# Patient Record
Sex: FEMALE | Race: WHITE
[De-identification: names, ages, dates, MRNs, and addresses within clinical notes are randomized per-mention and may not be internally consistent; named-entity substitution may affect disease eponyms.]

---

## 2017-03-04 ENCOUNTER — HOSPITAL ENCOUNTER (OUTPATIENT)
Dept: HOSPITAL 47 - RADMAMWWP | Age: 42
End: 2017-03-04
Payer: COMMERCIAL

## 2017-03-04 DIAGNOSIS — Z12.31: Primary | ICD-10-CM

## 2017-03-06 NOTE — MM
Reason for exam: screening  (asymptomatic).

Last mammogram was performed 4 years and 1 month ago.



History:

Family history of breast cancer in maternal aunt.

Benign US left guided mammotome of the left breast, March 28, 2008.  Benign US 

left CoreBiopsy of both breasts, March 28, 2008.



Physical Findings:

A clinical breast exam by your physician is recommended on an annual basis and 

results should be correlated with mammographic findings.



MG Screening Mammo w CAD

Bilateral CC and MLO view(s) were taken.

Prior study comparison: February 14, 2013, bilateral digital screening mammo 

w/CAD.  August 2, 2012, left diagnostic mammogram w/CAD.

The breast tissue is heterogeneously dense. This may lower the sensitivity of 

mammography.  Previous mammotome biopsy in the left breast x 2. There is no 

discrete abnormality.





ASSESSMENT: Benign, BI-RAD 2



RECOMMENDATION:

Routine screening mammogram of both breasts in 1 year.

## 2017-03-28 ENCOUNTER — HOSPITAL ENCOUNTER (EMERGENCY)
Dept: HOSPITAL 47 - EC | Age: 42
Discharge: HOME | End: 2017-03-28
Payer: COMMERCIAL

## 2017-03-28 VITALS
SYSTOLIC BLOOD PRESSURE: 108 MMHG | DIASTOLIC BLOOD PRESSURE: 65 MMHG | RESPIRATION RATE: 18 BRPM | HEART RATE: 66 BPM | TEMPERATURE: 97.9 F

## 2017-03-28 DIAGNOSIS — Z79.899: ICD-10-CM

## 2017-03-28 DIAGNOSIS — K21.9: Primary | ICD-10-CM

## 2017-03-28 LAB
ALP SERPL-CCNC: 83 U/L (ref 38–126)
ALT SERPL-CCNC: 36 U/L (ref 9–52)
AMYLASE SERPL-CCNC: 78 U/L (ref 30–110)
ANION GAP SERPL CALC-SCNC: 13 MMOL/L
AST SERPL-CCNC: 28 U/L (ref 14–36)
BASOPHILS # BLD AUTO: 0.1 K/UL (ref 0–0.2)
BASOPHILS NFR BLD AUTO: 1 %
BUN SERPL-SCNC: 8 MG/DL (ref 7–17)
CALCIUM SPEC-MCNC: 9.5 MG/DL (ref 8.4–10.2)
CH: 30.8
CHCM: 34.6
CHLORIDE SERPL-SCNC: 104 MMOL/L (ref 98–107)
CO2 SERPL-SCNC: 24 MMOL/L (ref 22–30)
EOSINOPHIL # BLD AUTO: 1 K/UL (ref 0–0.7)
EOSINOPHIL NFR BLD AUTO: 11 %
ERYTHROCYTE [DISTWIDTH] IN BLOOD BY AUTOMATED COUNT: 5.14 M/UL (ref 3.8–5.4)
ERYTHROCYTE [DISTWIDTH] IN BLOOD: 13.2 % (ref 11.5–15.5)
GLUCOSE SERPL-MCNC: 85 MG/DL (ref 74–99)
HCT VFR BLD AUTO: 46.1 % (ref 34–46)
HDW: 2.48
HGB BLD-MCNC: 15.6 GM/DL (ref 11.4–16)
LUC NFR BLD AUTO: 2 %
LYMPHOCYTES # SPEC AUTO: 2.2 K/UL (ref 1–4.8)
LYMPHOCYTES NFR SPEC AUTO: 24 %
MCH RBC QN AUTO: 30.3 PG (ref 25–35)
MCHC RBC AUTO-ENTMCNC: 33.8 G/DL (ref 31–37)
MCV RBC AUTO: 89.6 FL (ref 80–100)
MONOCYTES # BLD AUTO: 0.4 K/UL (ref 0–1)
MONOCYTES NFR BLD AUTO: 4 %
NEUTROPHILS # BLD AUTO: 5.5 K/UL (ref 1.3–7.7)
NEUTROPHILS NFR BLD AUTO: 59 %
NON-AFRICAN AMERICAN GFR(MDRD): >60
PH UR: 7 [PH] (ref 5–8)
POTASSIUM SERPL-SCNC: 3.9 MMOL/L (ref 3.5–5.1)
PROT SERPL-MCNC: 7.9 G/DL (ref 6.3–8.2)
SODIUM SERPL-SCNC: 141 MMOL/L (ref 137–145)
SP GR UR: 1.01 (ref 1–1.03)
UA BILLING (MACRO VS. MICRO): (no result)
UROBILINOGEN UR QL STRIP: <2 MG/DL (ref ?–2)
WBC # BLD AUTO: 0.16 10*3/UL
WBC # BLD AUTO: 9.3 K/UL (ref 3.8–10.6)
WBC (PEROX): 9.33

## 2017-03-28 PROCEDURE — 36415 COLL VENOUS BLD VENIPUNCTURE: CPT

## 2017-03-28 PROCEDURE — 83690 ASSAY OF LIPASE: CPT

## 2017-03-28 PROCEDURE — 74000: CPT

## 2017-03-28 PROCEDURE — 80053 COMPREHEN METABOLIC PANEL: CPT

## 2017-03-28 PROCEDURE — 81025 URINE PREGNANCY TEST: CPT

## 2017-03-28 PROCEDURE — 74177 CT ABD & PELVIS W/CONTRAST: CPT

## 2017-03-28 PROCEDURE — 85025 COMPLETE CBC W/AUTO DIFF WBC: CPT

## 2017-03-28 PROCEDURE — 81003 URINALYSIS AUTO W/O SCOPE: CPT

## 2017-03-28 PROCEDURE — 99284 EMERGENCY DEPT VISIT MOD MDM: CPT

## 2017-03-28 PROCEDURE — 82150 ASSAY OF AMYLASE: CPT

## 2017-03-28 NOTE — ED
Abdominal Pain HPI





- General


Chief Complaint: Abdominal Pain


Stated Complaint: abd pain


Time Seen by Provider: 17 16:51


Source: patient, RN notes reviewed


Mode of arrival: ambulatory


Limitations: no limitations





- History of Present Illness


Initial Comments: 





Patient is a 41-year-old female presents to the emergency room for evaluation 

of abdominal pain.  Patient states she has been having right upper quadrant/

midepigastric pain for the past week.  Patient states she has pain every time 

she eats.  Patient does state she has a history of gastritis.  Patient states 

she had a cholecystectomy  with no complications.  Patient states that she 

follow-up with her primary care provider yesterday and she sent home with 

Protonix.  Patient states she took Protonix last night with no relief of 

symptoms.  Patient also states that she was prescribed Bentyl but has not taken 

that medication yet.  Patient denies any significant pain at the moment.  

Patient states she's having 3 out of 10 pain.  Patient denies any current 

nausea or vomiting.  Patient does also states she's been having diarrhea.  

Patient denies new foods or recent travel outside the country.  Patient denies 

blood in stools.  Patient does state that she has been on a diet plan that is 

high in protein low in carbs.  SPatient also states that she's been taking a 

few diet pills.  Patient is not sure if this is what causing her symptoms.  

Patient states she discontinued the diet pills.  Patient denies chest pain or 

shortness of breath.  Patient denies any significant past medical history.  

Patient denies being on any medications besides besides what was prescribed to 

her yesterday.





- Related Data


 Home Medications











 Medication  Instructions  Recorded  Confirmed


 


Pantoprazole [Protonix] 40 mg PO DAILY 17











 Allergies











Allergy/AdvReac Type Severity Reaction Status Date / Time


 


No Known Allergies Allergy   Verified 17 17:04














Review of Systems


ROS Statement: 


Those systems with pertinent positive or pertinent negative responses have been 

documented in the HPI.





ROS Other: All systems not noted in ROS Statement are negative.





Past Medical History


Additional Past Medical History / Comment(s): CONSTIPATION, DIARRHEA, HX OF 

POLYP


History of Any Multi-Drug Resistant Organisms: None Reported


Past Surgical History:  Section, Cholecystectomy


Additional Past Surgical History / Comment(s): COLONOSCOPY, EGD


Past Anesthesia/Blood Transfusion Reactions: No Reported Reaction


Past Psychological History: Depression


Smoking Status: Never smoker


Past Alcohol Use History: Rare


Past Drug Use History: None Reported





- Past Family History


  ** Father


Family Medical History: Cancer





General Exam





- General Exam Comments


Initial Comments: 





Sitting on exam bed, no acute distress.


Limitations: no limitations


General appearance: alert, in no apparent distress


Head exam: Present: atraumatic, normocephalic, normal inspection


Eye exam: Present: normal appearance


ENT exam: Present: normal exam


Neck exam: Present: normal inspection


Respiratory exam: Present: normal lung sounds bilaterally.  Absent: respiratory 

distress


Cardiovascular Exam: Present: regular rate, normal rhythm, normal heart sounds


GI/Abdominal exam: Present: soft, tenderness (Midepigastric), normal bowel 

sounds.  Absent: distended, guarding, rebound, rigid


Extremities exam: Present: normal inspection


Back exam: Present: normal inspection


Neurological exam: Present: alert, oriented X3, CN II-XII intact, normal gait


Psychiatric exam: Present: normal affect, normal mood


Skin exam: Present: warm, dry, intact, normal color.  Absent: rash





Course


 Vital Signs











  17





  16:13


 


Temperature 97.7 F


 


Pulse Rate 67


 


Respiratory 16





Rate 


 


Blood Pressure 117/74


 


O2 Sat by Pulse 98





Oximetry 














Medical Decision Making





- Medical Decision Making





Patient is a 41-year-old female presents to the emergency room for epigastric 

pain after eating.  Labs show no acute findings.  CT abdomen/pelvis show no 

acute findings.  Patient states the GI cocktail did not worsen or improve her 

symptoms because she's not having any significant pain at the moment.  Advised 

patient to follow-up with GI specialist for further evaluation and possible 

endoscopy to rule out gastric ulcer.  Advised patient to continue taking her 

medications as prescribed to her yesterday from her primary care provider.  

Patient states she understands everything that was discussed with her.  Return 

parameters discussed.  Case discussed with Dr. Freeman.





- Lab Data


Result diagrams: 


 17 17:20





 17 17:20


 Lab Results











  17 Range/Units





  17:07 17:07 17:20 


 


WBC     (3.8-10.6)  k/uL


 


RBC     (3.80-5.40)  m/uL


 


Hgb     (11.4-16.0)  gm/dL


 


Hct     (34.0-46.0)  %


 


MCV     (80.0-100.0)  fL


 


MCH     (25.0-35.0)  pg


 


MCHC     (31.0-37.0)  g/dL


 


RDW     (11.5-15.5)  %


 


Plt Count     (150-450)  k/uL


 


Neutrophils %     %


 


Lymphocytes %     %


 


Monocytes %     %


 


Eosinophils %     %


 


Basophils %     %


 


Neutrophils #     (1.3-7.7)  k/uL


 


Lymphocytes #     (1.0-4.8)  k/uL


 


Monocytes #     (0-1.0)  k/uL


 


Eosinophils #     (0-0.7)  k/uL


 


Basophils #     (0-0.2)  k/uL


 


Sodium    141  (137-145)  mmol/L


 


Potassium    3.9  (3.5-5.1)  mmol/L


 


Chloride    104  ()  mmol/L


 


Carbon Dioxide    24  (22-30)  mmol/L


 


Anion Gap    13  mmol/L


 


BUN    8  (7-17)  mg/dL


 


Creatinine    0.61  (0.52-1.04)  mg/dL


 


Est GFR (MDRD) Af Amer    >60  (>60 ml/min/1.73 sqM)  


 


Est GFR (MDRD) Non-Af    >60  (>60 ml/min/1.73 sqM)  


 


Glucose    85  (74-99)  mg/dL


 


Calcium    9.5  (8.4-10.2)  mg/dL


 


Total Bilirubin    1.3  (0.2-1.3)  mg/dL


 


AST    28  (14-36)  U/L


 


ALT    36  (9-52)  U/L


 


Alkaline Phosphatase    83  ()  U/L


 


Total Protein    7.9  (6.3-8.2)  g/dL


 


Albumin    4.5  (3.5-5.0)  g/dL


 


Amylase    78  ()  U/L


 


Lipase    153  ()  U/L


 


Urine Color   Yellow   


 


Urine Appearance   Clear   (Clear)  


 


Urine pH   7.0   (5.0-8.0)  


 


Ur Specific Gravity   1.010   (1.001-1.035)  


 


Urine Protein   Negative   (Negative)  


 


Urine Glucose (UA)   Negative   (Negative)  


 


Urine Ketones   Negative   (Negative)  


 


Urine Blood   Negative   (Negative)  


 


Urine Nitrite   Negative   (Negative)  


 


Urine Bilirubin   Negative   (Negative)  


 


Urine Urobilinogen   <2.0   (<2.0)  mg/dL


 


Ur Leukocyte Esterase   Negative   (Negative)  


 


Urine HCG, Qual  Not Detected    (Not Detectd)  














  17 Range/Units





  17:20 


 


WBC  9.3  (3.8-10.6)  k/uL


 


RBC  5.14  (3.80-5.40)  m/uL


 


Hgb  15.6  (11.4-16.0)  gm/dL


 


Hct  46.1 H  (34.0-46.0)  %


 


MCV  89.6  (80.0-100.0)  fL


 


MCH  30.3  (25.0-35.0)  pg


 


MCHC  33.8  (31.0-37.0)  g/dL


 


RDW  13.2  (11.5-15.5)  %


 


Plt Count  183  (150-450)  k/uL


 


Neutrophils %  59  %


 


Lymphocytes %  24  %


 


Monocytes %  4  %


 


Eosinophils %  11  %


 


Basophils %  1  %


 


Neutrophils #  5.5  (1.3-7.7)  k/uL


 


Lymphocytes #  2.2  (1.0-4.8)  k/uL


 


Monocytes #  0.4  (0-1.0)  k/uL


 


Eosinophils #  1.0 H  (0-0.7)  k/uL


 


Basophils #  0.1  (0-0.2)  k/uL


 


Sodium   (137-145)  mmol/L


 


Potassium   (3.5-5.1)  mmol/L


 


Chloride   ()  mmol/L


 


Carbon Dioxide   (22-30)  mmol/L


 


Anion Gap   mmol/L


 


BUN   (7-17)  mg/dL


 


Creatinine   (0.52-1.04)  mg/dL


 


Est GFR (MDRD) Af Amer   (>60 ml/min/1.73 sqM)  


 


Est GFR (MDRD) Non-Af   (>60 ml/min/1.73 sqM)  


 


Glucose   (74-99)  mg/dL


 


Calcium   (8.4-10.2)  mg/dL


 


Total Bilirubin   (0.2-1.3)  mg/dL


 


AST   (14-36)  U/L


 


ALT   (9-52)  U/L


 


Alkaline Phosphatase   ()  U/L


 


Total Protein   (6.3-8.2)  g/dL


 


Albumin   (3.5-5.0)  g/dL


 


Amylase   ()  U/L


 


Lipase   ()  U/L


 


Urine Color   


 


Urine Appearance   (Clear)  


 


Urine pH   (5.0-8.0)  


 


Ur Specific Gravity   (1.001-1.035)  


 


Urine Protein   (Negative)  


 


Urine Glucose (UA)   (Negative)  


 


Urine Ketones   (Negative)  


 


Urine Blood   (Negative)  


 


Urine Nitrite   (Negative)  


 


Urine Bilirubin   (Negative)  


 


Urine Urobilinogen   (<2.0)  mg/dL


 


Ur Leukocyte Esterase   (Negative)  


 


Urine HCG, Qual   (Not Detectd)  














- Radiology Data


Radiology results: report reviewed, image reviewed





Disposition


Clinical Impression: 


 Abdominal pain, GERD (gastroesophageal reflux disease)





Disposition: HOME SELF-CARE


Condition: Good


Instructions:  Gastroesophageal Reflux Disease (ED), Diet for Stomach Ulcers 

and Gastritis (ED)


Additional Instructions: 


Please follow up with GI specialist for further evaluation.  Continue taking 

Protonix as directed.  If any new symptom arises or symptoms worsen, return to 

ER as soon as possible.  


Referrals: 


Lisette Crawford MD [Primary Care Provider] - 1-2 days


Kasia Salgado MD [STAFF PHYSICIAN] - 1-2 days


Time of Disposition: 19:40

## 2017-03-28 NOTE — CT
EXAMINATION TYPE: CT abdomen pelvis w con

 

DATE OF EXAM: 3/28/2017 6:54 PM

 

COMPARISON: NONE

 

HISTORY: Pt states of epigastric pain after eating x1 week.

 

CT DLP: 667.6 mGycm

Automated exposure control for dose reduction was used.

 

TECHNIQUE:  Helical acquisition of images was performed from the lung bases through the pelvis.

 

CONTRAST: 

Performed without Oral Contrast and with IV Contrast, patient injected with 100 mL of Omnipaque 300.

 

FINDINGS: 

LUNG BASES: No significant abnormality is appreciated.

 

LIVER/GB: No significant abnormality is appreciated.

 

PANCREAS: No significant abnormality is seen.

 

SPLEEN: No significant abnormality is seen.

 

ADRENALS: No significant abnormality is seen.

 

KIDNEYS: No significant abnormality is seen.

 

RETROPERITONEAL ADENOPATHY:  None visualized

 

REPRODUCTIVE ORGANS: No significant abnormality is seen

 

URINARY BLADDER:  No significant abnormality is seen.

 

PELVIC ADENOPATHY:  None visualized.

 

OSSEOUS STRUCTURES:  No significant abnormality is seen.

 

BOWEL:  No significant abnormality is seen.

 

OTHER: Incidental note is made of bilateral prominent adnexal/broad ligament varices. The uterus is n
ot enlarged and has otherwise normal CT appearance.

 

IMPRESSION: 

NO ACUTE PROCESS.

## 2017-03-28 NOTE — XR
EXAMINATION TYPE: Abdominal radiographs x2 

 

DATE OF EXAM: 3/28/2017 5:42 PM

 

COMPARISON: NONE

 

HISTORY: Epigastric pain radiating around to the back with nausea for a week

 

TECHNIQUE: 2 upright radiographs.

 

FINDINGS: There is no pneumatosis and no pneumoperitoneum. The bowel gas pattern is negative. There i
s no evidence of soft tissue mass, and the skeletal structures are unremarkable. Cholecystectomy surg
ical clips are incidentally noted.

 

The visualized lung bases and pleural spaces are negative.

 

IMPRESSION: No acute process.

## 2017-04-03 ENCOUNTER — HOSPITAL ENCOUNTER (OUTPATIENT)
Dept: HOSPITAL 47 - ORWHC2ENDO | Age: 42
Discharge: HOME | End: 2017-04-03
Payer: COMMERCIAL

## 2017-04-03 VITALS — RESPIRATION RATE: 18 BRPM | SYSTOLIC BLOOD PRESSURE: 118 MMHG | DIASTOLIC BLOOD PRESSURE: 56 MMHG

## 2017-04-03 VITALS — HEART RATE: 70 BPM

## 2017-04-03 VITALS — TEMPERATURE: 98 F

## 2017-04-03 VITALS — BODY MASS INDEX: 32.8 KG/M2

## 2017-04-03 DIAGNOSIS — K44.9: ICD-10-CM

## 2017-04-03 DIAGNOSIS — K29.50: Primary | ICD-10-CM

## 2017-04-03 DIAGNOSIS — Z79.899: ICD-10-CM

## 2017-04-03 DIAGNOSIS — K21.9: ICD-10-CM

## 2017-04-03 DIAGNOSIS — R13.10: ICD-10-CM

## 2017-04-03 PROCEDURE — 43239 EGD BIOPSY SINGLE/MULTIPLE: CPT

## 2017-04-03 PROCEDURE — 81025 URINE PREGNANCY TEST: CPT

## 2017-04-03 PROCEDURE — 88342 IMHCHEM/IMCYTCHM 1ST ANTB: CPT

## 2017-04-03 PROCEDURE — 88305 TISSUE EXAM BY PATHOLOGIST: CPT

## 2017-04-03 NOTE — P.PCN
Date of Procedure: 04/03/17


Procedure(s) Performed: 


Procedure: Esophagogastroduodenoscopy and biopsy.





Preoperative diagnosis: Acute epigastric pain rule out peptic ulcer disease.





Postoperative diagnosis: Small sliding hiatal hernia with no obvious 

esophagitis or complicated reflux disease.  Mild antral gastritis.  No ulcers, 

gastric outlet obstruction or bleeding.  Multiple biopsies obtained from the 

duodenum, antrum and esophagus.





Preparation and sedation: Was provided by anesthesia.





Brief clinical history: The patient is a 41-year-old female who is referred for 

this evaluation for acute onset of epigastric pain for possible peptic ulcer 

disease.  The patient had prior cholecystectomy. She had an upper endoscopy and 

colonoscopy back in October 2016 for chronic stomach upset symptoms of around 6 

months duration and was found to have mild gastritis.  The patient received 

Zantac which she was still taking until the onset of her current symptoms 

around 1-1/2 to 2 weeks ago.  The patient has been complaining of postprandial 

epigastric pain and indigestion feeling that would last for several hours.  

Associated with nausea but no vomiting.  For the last week, she was having 

diarrhea as well.  The patient was started on Protonix as outpatient, however, 

as she has not improved, this evaluation was requested to rule out peptic ulcer 

disease or other pathology.





Procedure: With the patient on her left lateral decubitus position and after 

informed consent and adequate sedation, I passed the Olympus- video 

upper endoscope through the cricopharyngeus down the esophagus.  GE junction 

was around 35-36 cm from the incisors and there was a small sliding hiatal 

hernia.  The esophagus did not show any obvious erosions, ulcers, strictures or 

Porras's esophagus.  The endoscope was then advanced into the stomach which 

was insufflated with air and inspected in detail including the retroflex view 

in the cardia.  There was some mottling and erythema in the antrum but no 

ulcers or erosions.  Pyloric channel did not show any ulcers.  Duodenal bulb, 

post bulbar area and descending duodenum showed minimal erythema with no ulcers 

or erosions.  I obtained multiple biopsies from the duodenum, antrum and 

esophagus then the endoscope was withdrawn.





The patient tolerated the procedure well.





Plan: The patient was reassured.  Will await biopsy results.  She will follow-

up with you as planned and further plans will be made based on her course.

## 2018-01-18 ENCOUNTER — HOSPITAL ENCOUNTER (OUTPATIENT)
Dept: HOSPITAL 47 - LABWHC1 | Age: 43
Discharge: HOME | End: 2018-01-18
Attending: INTERNAL MEDICINE
Payer: COMMERCIAL

## 2018-01-18 DIAGNOSIS — K52.9: ICD-10-CM

## 2018-01-18 DIAGNOSIS — R50.9: Primary | ICD-10-CM

## 2018-01-18 PROCEDURE — 87502 INFLUENZA DNA AMP PROBE: CPT

## 2018-04-04 ENCOUNTER — HOSPITAL ENCOUNTER (OUTPATIENT)
Dept: HOSPITAL 47 - RADMAMWWP | Age: 43
Discharge: HOME | End: 2018-04-04
Payer: COMMERCIAL

## 2018-04-04 DIAGNOSIS — N63.10: Primary | ICD-10-CM

## 2018-04-04 PROCEDURE — 77066 DX MAMMO INCL CAD BI: CPT

## 2018-04-04 NOTE — MM
Reason for exam: clinical finding.

Last mammogram was performed 1 year and 1 month ago.



History:

Family history of breast cancer in maternal aunt.

Benign US left guided mammotome of the left breast, March 28, 2008.  Benign US 

left CoreBiopsy of both breasts, March 28, 2008.



Physical Findings:

Nurse Summary: 0.5cm nodule in the left breast at 3 o'clock (nurse ashely).



MG Diagnostic Mammo w CAD CAITLIN

Bilateral CC and MLO view(s) were taken.

Prior study comparison: March 4, 2017, bilateral MG screening mammo w CAD.  

February 14, 2013, bilateral digital screening mammo w/CAD.

The breast tissue is heterogeneously dense. This may lower the sensitivity of 

mammography.

Finding: There is an equal density (isodense), indistinct irregular mass in the 

upper quadrant, posterior position of the right breast, different in appearance, 

likely projectional. Previous mammotome biopsy in the left breast x 2.

New finding since March 4, 2017 and February 14, 2013.



These results were verbally communicated with the patient and result sheet given 

to the patient on 4/4/18.





ASSESSMENT: Incomplete: need additional imaging evaluation, BI-RAD 0



RECOMMENDATION:

Ultrasound of the right breast.

## 2018-04-04 NOTE — USB
Reason for exam: additional evaluation requested from abnormal screening.



History:

Family history of breast cancer in maternal aunt.

Benign US left guided mammotome of the left breast, March 28, 2008.  Benign US 

left CoreBiopsy of both breasts, March 28, 2008.



US Breast Limited RT

Right breast ultrasound demonstrates a 0.3 x 0.2 x 0.4cm lesion too small to 

characterize at 3 o'clock.



These results were verbally communicated with the patient and result sheet given 

to the patient on 4/4/18.





ASSESSMENT: Probably benign, BI-RAD 3



RECOMMENDATION:

Follow-up diagnostic mammogram of the right breast in 6 months.

## 2018-07-03 ENCOUNTER — HOSPITAL ENCOUNTER (OUTPATIENT)
Dept: HOSPITAL 47 - RADMAMWWP | Age: 43
Discharge: HOME | End: 2018-07-03
Payer: COMMERCIAL

## 2018-07-03 DIAGNOSIS — R92.8: Primary | ICD-10-CM

## 2018-07-03 PROCEDURE — 77065 DX MAMMO INCL CAD UNI: CPT

## 2018-07-03 PROCEDURE — 77061 BREAST TOMOSYNTHESIS UNI: CPT

## 2018-07-03 NOTE — USB
Reason for exam: additional evaluation requested from abnormal screening.



History:

Family history of breast cancer in maternal aunt.

Benign US left guided mammotome of the left breast, March 28, 2008.  Benign US 

left CoreBiopsy of both breasts, March 28, 2008.



US Breast Limited RT

Right limited breast ultrasound including focal area of concern, retroareolar and 

axilla demonstrates a 5 x 4 x 5mm oval, cystic lesion at 11 o'clock, a 3 x 2 x 3mm

oval, cystic lesion at 3 o'clock appears cystic today and smaller than prior 

however being this appeared solid on the prior a 6 month follow up ultrasound 

remains recommended and a 5mm oval, lymph node at the axilla tail, morphologically

normal.



These results were verbally communicated with the patient and result sheet given 

to the patient on 7/3/18.





ASSESSMENT: Probably benign, BI-RAD 3



RECOMMENDATION:

Ultrasound and follow-up diagnostic mammogram of the right breast in 6 months.

(for superior asymmetry on ML view only)

## 2018-07-03 NOTE — MM
Reason for exam: follow-up at short interval from prior study.

Last mammogram was performed 3 months ago.



History:

Family history of breast cancer in maternal aunt.

Benign US left guided mammotome of the left breast, March 28, 2008.  Benign US 

left CoreBiopsy of both breasts, March 28, 2008.



Physical Findings:

Nurse Summary: 0.5cm nodule in the right breast at 3 o'clock (nurse mj).



MG 3D Diag Mammo W/Cad RT

CC, MLO, and ML view(s) were taken of the right breast.

Prior study comparison: April 4, 2018, bilateral MG diagnostic mammo w CAD CAITLIN.  

March 4, 2017, bilateral MG screening mammo w CAD.

The breast tissue is heterogeneously dense. This may lower the sensitivity of 

mammography.  The known 3 o'clock, 4cm from nipple, 4mm mass seen sonographically 

4/4/18 is not seen mammographically and will be followed with ultrasound today. 

Upper outer quadrant focal asymmetry at posterior depth appears similar to the 

prior. However, precautionary ultrasound will be performed.



These results were verbally communicated with the patient and result sheet given 

to the patient on 7/3/18.





ASSESSMENT: Incomplete: need additional imaging evaluation, BI-RAD 0



RECOMMENDATION:

Ultrasound of the right breast.

(upper right breast and retroareolar palpable)

## 2019-01-07 ENCOUNTER — HOSPITAL ENCOUNTER (OUTPATIENT)
Dept: HOSPITAL 47 - RADMAMWWP | Age: 44
Discharge: HOME | End: 2019-01-07
Attending: SURGERY
Payer: COMMERCIAL

## 2019-01-07 DIAGNOSIS — R92.8: Primary | ICD-10-CM

## 2019-01-07 PROCEDURE — 77061 BREAST TOMOSYNTHESIS UNI: CPT

## 2019-01-07 PROCEDURE — 77065 DX MAMMO INCL CAD UNI: CPT

## 2019-01-08 NOTE — USB
Reason for exam: clinical finding.



History:

Family history of breast cancer in maternal aunt.

Benign US left guided mammotome of the left breast, March 28, 2008.  Benign US 

left CoreBiopsy of both breasts, March 28, 2008.

Indicated problem(s): palpable abnormality in the right breast.



Physical Findings:

Nurse Summary: less than a 1 cm palpable abnormality right breast.



US Breast RT

Right complete breast ultrasound includes all four quadrants, the retroareolar 

region and axilla. Finding demonstrates a 4 x 2 x 3 mm cystic lesion at 3 o'clock,

a 4 x 3 x 4 mm cluster cystic lesion at 11 o'clock and at the palpable ductal 

ectasia is seen.



These results were verbally communicated with the patient and result sheet given 

to the patient on 1/7/19.





ASSESSMENT: Benign, BI-RAD 2



RECOMMENDATION:

Routine screening mammogram of both breasts in 6 months.

## 2019-01-08 NOTE — MM
Reason for exam: follow-up at short interval from prior study.

Last mammogram was performed 6 months ago.



History:

Family history of breast cancer in maternal aunt.

Benign US left guided mammotome of the left breast, March 28, 2008.  Benign US 

left CoreBiopsy of both breasts, March 28, 2008.

Indicated problem(s): other indicated problem in the right breast.



Physical Findings:

Nurse Summary: less than a 1 cm movable lesion right breast.



MG 3D Diag Mammo W/Cad RT

CC and MLO view(s) were taken of the right breast.

Prior study comparison: July 3, 2018, right breast MG 3d diag mammo w/cad RT.  

April 4, 2018, bilateral MG diagnostic mammo w CAD CAITLIN.

The breast tissue is heterogeneously dense. This may lower the sensitivity of 

mammography.

There are benign-appearing round calcification in the right breast.  No discrete 

abnormality.



These results were verbally communicated with the patient and result sheet given 

to the patient on 1/7/19.





ASSESSMENT: Incomplete: need additional imaging evaluation, BI-RAD 0



RECOMMENDATION:

Ultrasound of the right breast.

## 2019-07-08 ENCOUNTER — HOSPITAL ENCOUNTER (OUTPATIENT)
Dept: HOSPITAL 47 - RADMAMWWP | Age: 44
Discharge: HOME | End: 2019-07-08
Attending: SURGERY
Payer: COMMERCIAL

## 2019-07-08 DIAGNOSIS — Z12.31: Primary | ICD-10-CM

## 2019-07-08 PROCEDURE — 77067 SCR MAMMO BI INCL CAD: CPT

## 2019-07-09 NOTE — MM
Reason for exam: screening  (asymptomatic).

Last mammogram was performed 6 months ago.



History:

Family history of breast cancer in maternal aunt.

Benign US left guided mammotome of the left breast, March 28, 2008.  Benign US 

left CoreBiopsy of both breasts, March 28, 2008.



Physical Findings:

A clinical breast exam by your physician is recommended on an annual basis and 

results should be correlated with mammographic findings.



MG Screening Mammo w CAD

Bilateral CC and MLO view(s) were taken.

Prior study comparison: January 7, 2019, right breast MG 3d diag mammo w/cad RT.  

July 3, 2018, right breast MG 3d diag mammo w/cad RT.

The breast tissue is heterogeneously dense. This may lower the sensitivity of 

mammography.  No suspicious abnormality. Left biopsy markers noted.  No 

significant changes when compared with prior studies.





ASSESSMENT: Negative, BI-RAD 1



RECOMMENDATION:

Routine screening mammogram of both breasts in 1 year.

## 2019-08-25 ENCOUNTER — HOSPITAL ENCOUNTER (EMERGENCY)
Dept: HOSPITAL 47 - EC | Age: 44
Discharge: HOME | End: 2019-08-25
Payer: COMMERCIAL

## 2019-08-25 VITALS
TEMPERATURE: 97.7 F | SYSTOLIC BLOOD PRESSURE: 119 MMHG | RESPIRATION RATE: 20 BRPM | HEART RATE: 97 BPM | DIASTOLIC BLOOD PRESSURE: 73 MMHG

## 2019-08-25 DIAGNOSIS — Z79.899: ICD-10-CM
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
## 2019-08-25 DIAGNOSIS — R22.32: Primary | ICD-10-CM

## 2019-08-25 PROCEDURE — 99284 EMERGENCY DEPT VISIT MOD MDM: CPT
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
## 2019-12-23 ENCOUNTER — HOSPITAL ENCOUNTER (OUTPATIENT)
Dept: HOSPITAL 47 - RADUSWWP | Age: 44
Discharge: HOME | End: 2019-12-23
Attending: FAMILY MEDICINE
Payer: COMMERCIAL

## 2019-12-23 DIAGNOSIS — R10.2: Primary | ICD-10-CM

## 2019-12-23 PROCEDURE — 76830 TRANSVAGINAL US NON-OB: CPT

## 2019-12-24 NOTE — US
EXAMINATION TYPE: US transvaginal

 

DATE OF EXAM: 2019

 

COMPARISON: CT 2017

 

CLINICAL HISTORY: R10.2 PELVIC PAIN. Intermittent  pelvic pain/pressure x 1 month; 

 

TECHNIQUE:  TV US. Transvaginal sonographic images were medically necessary per physician's order

 

Date of LMP:  2019

 

EXAM MEASUREMENTS:

 

Uterus:  9.6 x 5.7 x 4.5  cm

Endometrial Stripe: 0.8 cm upper right endometrium and 0.7cm upper left endometrium 

Right Ovary:  2.5 x 2.4 x 1.5 cm

Left Ovary:  2.6 x 2.0 x 1.8 cm

 

 

 

1. Uterus:  Anteverted; small amount of free fluid in cervix = 0.8 x 0.7 x 0.1cm  

2. Endometrium:  arcuate appearance to upper endometrium; thicker for Day 10LMP 

3. Right Ovary:  multifollicular with largest cyst = 0.8 x 0.8 x 1.0cm 

4. Left Ovary:  multiple follicles with largest = 0.5 x 0.7 x 0.6cm 

**Spectral, color and waveform Doppler imaging shows good arterial and venous flow within the ovaries
; .

5. Bilateral Adnexa:  wnl

6. Posterior cul-de-sac:  wnl

 

Heterogeneous uterus with somewhat arcuate-type morphology appreciated on transverse imaging. Tiny fr
ee fluid in the endometrial canal at level cervix. Endometrial stripe measures upper limits of normal
 for late proliferative cycle. No free fluid in pelvis.

 

 Both ovaries are seen and normal in size with scattered small follicles

 

IMPRESSION: No suspicious findings seen to account for patient's symptoms of intermittent pelvic pain
 and pressure.

## 2021-03-30 ENCOUNTER — HOSPITAL ENCOUNTER (OUTPATIENT)
Dept: HOSPITAL 47 - RADUSWWP | Age: 46
Discharge: HOME | End: 2021-03-30
Attending: FAMILY MEDICINE
Payer: COMMERCIAL

## 2021-03-30 DIAGNOSIS — R13.10: Primary | ICD-10-CM

## 2021-03-30 PROCEDURE — 76536 US EXAM OF HEAD AND NECK: CPT

## 2021-03-30 NOTE — US
EXAMINATION TYPE: US thyroid st tissue head/neck

 

DATE OF EXAM: 3/30/2021

 

COMPARISON: NONE

 

CLINICAL HISTORY: 45-year-old female R13.10 Dysphagia. 

 

TECHNIQUE: Multiple sonographic images of the thyroid gland are obtained.

 

FINDINGS:

 

GLAND SIZE:

 

Right Lobe: 5.0 x 1.7 x 1.9 cm

** Overall Parenchyma:  homogenous

Left Lobe: 4.8 x 1.2 x 1.5  cm

** Overall Parenchyma:  homogeneous

Isthmus Thickness:  .3 cm

 

NODULES

 

RIGHT:   # of nodules measured on right:   0 

 

 

LEFT:    # of nodules measured on left: 0

 

 

ISTHMUS:    # of nodules measured in the isthmus:  0

 

Bilateral neck scanned, no evidence of lymphadenopathy.

 

 

 

 

 

IMPRESSION:

Borderline thyromegaly. No discrete nodules.

## 2021-04-15 ENCOUNTER — HOSPITAL ENCOUNTER (OUTPATIENT)
Dept: HOSPITAL 47 - RADECHMAIN | Age: 46
Discharge: HOME | End: 2021-04-15
Attending: FAMILY MEDICINE
Payer: COMMERCIAL

## 2021-04-15 DIAGNOSIS — R00.2: Primary | ICD-10-CM

## 2021-04-15 PROCEDURE — 93270 REMOTE 30 DAY ECG REV/REPORT: CPT

## 2021-04-26 ENCOUNTER — HOSPITAL ENCOUNTER (OUTPATIENT)
Dept: HOSPITAL 47 - ORWHC2ENDO | Age: 46
Discharge: HOME | End: 2021-04-26
Attending: INTERNAL MEDICINE
Payer: COMMERCIAL

## 2021-04-26 VITALS — TEMPERATURE: 98.3 F

## 2021-04-26 VITALS — HEART RATE: 65 BPM | SYSTOLIC BLOOD PRESSURE: 116 MMHG | DIASTOLIC BLOOD PRESSURE: 68 MMHG

## 2021-04-26 VITALS — BODY MASS INDEX: 35.6 KG/M2

## 2021-04-26 VITALS — RESPIRATION RATE: 16 BRPM

## 2021-04-26 DIAGNOSIS — K29.50: Primary | ICD-10-CM

## 2021-04-26 DIAGNOSIS — Z80.0: ICD-10-CM

## 2021-04-26 DIAGNOSIS — K21.9: ICD-10-CM

## 2021-04-26 DIAGNOSIS — Z79.899: ICD-10-CM

## 2021-04-26 PROCEDURE — 88305 TISSUE EXAM BY PATHOLOGIST: CPT

## 2021-04-26 PROCEDURE — 43239 EGD BIOPSY SINGLE/MULTIPLE: CPT

## 2021-04-26 PROCEDURE — 81025 URINE PREGNANCY TEST: CPT

## 2021-04-26 NOTE — P.PCN
Date of Procedure: 04/26/21


Description of Procedure: 





BRIEF HISTORY: 


Patient is a 45-year-old female presenting for outpatient 

esophagogastroduodenoscopy for evaluation of GERD without esophagitis.  Patient 

has reported symptoms of epigastric abdominal pain.  She started on a course of 

omeprazole therapy with no improvement.  She also reports some globus sensation 

and difficulty swallowing at times.  Previously the patient had EGD in 04/2017 

with findings of mild gastritis and small hiatal hernia.  She does report a 

family history of esophageal cancer in her mother.. 





PROCEDURE PERFORMED: 


Esophagogastroduodenoscopy with biopsy.





PREOPERATIVE DIAGNOSIS: 


GERD without esophagitis, globus. 





ESTIMATED BLOOD LOSS: 


Minimal.





IV sedation per anesthesia. 





PROCEDURE: 


After informed consent was obtained, the patient  was brought into the endoscopy

unit. IV sedation was administered by Anesthesia under continuous monitoring. 

Initially the Olympus GIF-190 video endoscope was inserted into the mouth. 

Esophagus intubated without any difficulty. It was gradually advanced into the 

stomach and duodenum and carefully examined. The bulb and the second part of the

duodenum appeared normal, with biopsies of the duodenum taken. The scope at this

time was withdrawn to the stomach, adequately insufflated with air, and upon 

careful examination, mucosa of the antrum, body, cardia and the fundus appeared 

normal,  Except for some mild punctate erythema in the antrum and body 

suggestive of mild gastritis biopsies taken. The scope was then withdrawn into 

the esophagus. The GE junction was located at 37 cm from the incisors,  With a 

small 2 cm hiatal hernia noted.  Biopsies of lower esophagus were taken. The e

sophagus appeared normal. There were no erosions or ulcerations seen and the 

patient tolerated the procedure well. 





IMPRESSION: 


1.  Mild gastritis .


2.  Small hiatal hernia.


3.  Biopsies of the duodenum, antrum body and lower esophagus.





RECOMMENDATIONS: 


The findings of this examination were discussed with the patient and her family.

 Okay to resume diet.  Okay to resume medications.  Await pathology from 

biopsies.  Follow up in the GI clinic as needed.  Can consider a retrial of PPI 

therapy to see if there is any symptomatic improvement.

## 2021-05-26 ENCOUNTER — HOSPITAL ENCOUNTER (OUTPATIENT)
Dept: HOSPITAL 47 - RADMAMWWP | Age: 46
Discharge: HOME | End: 2021-05-26
Attending: OBSTETRICS & GYNECOLOGY
Payer: COMMERCIAL

## 2021-05-26 DIAGNOSIS — Z12.31: Primary | ICD-10-CM

## 2021-05-26 DIAGNOSIS — Z80.3: ICD-10-CM

## 2021-05-26 PROCEDURE — 77063 BREAST TOMOSYNTHESIS BI: CPT

## 2021-05-26 PROCEDURE — 77067 SCR MAMMO BI INCL CAD: CPT

## 2021-05-27 NOTE — MM
Reason for exam: screening  (asymptomatic).

Last mammogram was performed 1 year and 11 months ago.



History:

Family history of breast cancer in maternal aunt.

Benign US left guided mammotome of the left breast, March 28, 2008.  Benign US 

left CoreBiopsy of both breasts, March 28, 2008.



Physical Findings:

A clinical breast exam by your physician is recommended on an annual basis and 

results should be correlated with mammographic findings.



MG 3D Screening Mammo W/Cad

Bilateral CC and MLO view(s) were taken.

Prior study comparison: July 8, 2019, bilateral MG screening mammo w CAD.  January 7, 2019, right breast MG 3d diag mammo w/cad RT.

The breast tissue is heterogeneously dense. This may lower the sensitivity of 

mammography.

Finding: There is a new 4 mm obscured round mass in the anterior position of the 

right breast on CC 40/74 and MLO view Previous mammotome biopsy in the left breast

x 2. There is a chronic nodularity in the left breast, 9mm, increased in size, no

prior left breast ultrasound, upper outer quadrant middle depth.





ASSESSMENT: Incomplete: need additional imaging evaluation, BI-RAD 0



RECOMMENDATION:

Ultrasound of both breasts.



[facility] will attempt to contact patient  to return for ultrasound.

## 2021-06-03 ENCOUNTER — HOSPITAL ENCOUNTER (OUTPATIENT)
Dept: HOSPITAL 47 - RADUSWWP | Age: 46
Discharge: HOME | End: 2021-06-03
Attending: OTOLARYNGOLOGY
Payer: COMMERCIAL

## 2021-06-03 DIAGNOSIS — K59.00: Primary | ICD-10-CM

## 2021-06-03 DIAGNOSIS — K44.9: ICD-10-CM

## 2021-06-03 PROCEDURE — 74220 X-RAY XM ESOPHAGUS 1CNTRST: CPT

## 2021-06-03 NOTE — FL
EXAMINATION TYPE: FL barium swallow

 

DATE OF EXAM: 6/3/2021

 

CLINICAL HISTORY: 45-year-old with chest discomfort. Constipation and diarrhea. Fullness in throat.

 

Fluoroscopy time is 2 minutes and 1 second

 

TECHNIQUE:  A double contrast esophagram is performed utilizing air and barium.   

 

COMPARISON: None

 

FINDINGS: 

 

The esophagus shows normal motility and emptying into the stomach. There is a small sliding-type hiat
al hernia.. No significant gastroesophageal reflux was seen during real time performance of this stud
y. Stomach and proximal small bowel are grossly unremarkable. Surgical clips in the right upper quadr
ant.

 

IMPRESSION:

1. Small sliding-type hiatal hernia. No evidence of gastroesophageal reflux.

## 2021-06-09 ENCOUNTER — HOSPITAL ENCOUNTER (OUTPATIENT)
Dept: HOSPITAL 47 - LABWHC1 | Age: 46
Discharge: HOME | End: 2021-06-09
Attending: OTOLARYNGOLOGY
Payer: COMMERCIAL

## 2021-06-09 DIAGNOSIS — E01.0: Primary | ICD-10-CM

## 2021-06-09 DIAGNOSIS — R53.83: ICD-10-CM

## 2021-06-09 DIAGNOSIS — M79.10: ICD-10-CM

## 2021-06-09 PROCEDURE — 86376 MICROSOMAL ANTIBODY EACH: CPT

## 2021-06-09 PROCEDURE — 36415 COLL VENOUS BLD VENIPUNCTURE: CPT

## 2021-06-09 PROCEDURE — 86431 RHEUMATOID FACTOR QUANT: CPT

## 2021-06-09 PROCEDURE — 84443 ASSAY THYROID STIM HORMONE: CPT

## 2021-06-09 PROCEDURE — 84439 ASSAY OF FREE THYROXINE: CPT

## 2021-06-09 PROCEDURE — 86038 ANTINUCLEAR ANTIBODIES: CPT

## 2021-06-10 LAB
RHEUMATOID FACT SERPL-ACNC: 5 IU/ML (ref 0–15)
T4 FREE SERPL-MCNC: 1 NG/DL (ref 0.8–1.8)

## 2021-06-28 ENCOUNTER — HOSPITAL ENCOUNTER (OUTPATIENT)
Dept: HOSPITAL 47 - RADUSWWP | Age: 46
Discharge: HOME | End: 2021-06-28
Attending: OBSTETRICS & GYNECOLOGY
Payer: COMMERCIAL

## 2021-06-28 DIAGNOSIS — Z80.3: ICD-10-CM

## 2021-06-28 DIAGNOSIS — N60.01: Primary | ICD-10-CM

## 2021-06-29 NOTE — USB
Reason for exam: additional evaluation requested from abnormal screening.



History:

Family history of breast cancer in maternal aunt.

Benign US left guided mammotome of the left breast, March 28, 2008.  Benign US 

left CoreBiopsy of both breasts, March 28, 2008.



Physical Findings:

Nurse did not find any significant physical abnormalities on exam.



US Breast Workup Limited CAITLIN

Right limited breast ultrasound including focal area of concern, retroareolar and 

axilla demonstrates a 0.4 x 0.3 x 0.3cm cystic, complex lesion with septations at 

6 o'clock and a 0.4 x 0.2 x 0.3cm cystic lesion at 9 o'clock, may corresond to 

mammographic nodule.



Left limited breast ultrasound including focal area of concern, retroareolar and 

axilla demonstrates a 0.8 x 0.5 x 0.7cm lymph node at 3 o'clock, corresponds to 

mammogram, benign appearing. Left breast back to screening mammogram in 1 year.



These results were verbally communicated with the patient and result sheet given 

to the patient on 6/28/21.





ASSESSMENT: Probably benign, BI-RAD 3



RECOMMENDATION:

Ultrasound of the right breast in 6 months.

## 2022-01-17 ENCOUNTER — HOSPITAL ENCOUNTER (OUTPATIENT)
Dept: HOSPITAL 47 - RADUSWWP | Age: 47
Discharge: HOME | End: 2022-01-17
Attending: OBSTETRICS & GYNECOLOGY
Payer: COMMERCIAL

## 2022-01-17 DIAGNOSIS — Z80.3: ICD-10-CM

## 2022-01-17 DIAGNOSIS — N60.01: Primary | ICD-10-CM

## 2022-01-17 NOTE — USB
Reason for exam: follow-up at short interval from prior study.



History:

Family history of breast cancer in maternal aunt.

Benign US left guided mammotome of the left breast, March 28, 2008.  Benign US 

left CoreBiopsy of both breasts, March 28, 2008.



Physical Findings:

Nurse did not find any significant physical abnormalities on exam.



US Breast Limited RT

Right limited breast ultrasound including focal area of concern, retroareolar and 

axilla demonstrates a 3 x 3 x 3mm oval, mixed, stable lesion at 6 o'clock, 

unchanged, a 3 x 2 x 3mm oval, cystic, stable lesion at 9 o'clock, unchanged and 

duct ectasia.



These results were verbally communicated with the patient and result sheet given 

to the patient on 1/17/22.





ASSESSMENT: Probably benign, BI-RAD 3



RECOMMENDATION:

Ultrasound of the right breast in 6 months.

Return to routine screening mammogram schedule for both breasts.

Back on schedule for May 2022.

## 2022-02-04 ENCOUNTER — HOSPITAL ENCOUNTER (OUTPATIENT)
Dept: HOSPITAL 47 - RADCTMAIN | Age: 47
Discharge: HOME | End: 2022-02-04
Attending: FAMILY MEDICINE
Payer: COMMERCIAL

## 2022-02-04 DIAGNOSIS — N32.89: ICD-10-CM

## 2022-02-04 DIAGNOSIS — N28.89: ICD-10-CM

## 2022-02-04 DIAGNOSIS — K44.9: Primary | ICD-10-CM

## 2022-02-04 PROCEDURE — 74177 CT ABD & PELVIS W/CONTRAST: CPT

## 2022-02-04 NOTE — CT
EXAMINATION TYPE: CT abdomen pelvis w con

 

DATE OF EXAM: 2/4/2022

 

COMPARISON: 3/28/2017

 

HISTORY: 46-year-old female R1 0.31, right lower quadrant pain, Abdomen Pain

 

TECHNIQUE: Contiguous axial scanning of the abdomen and pelvis following administration of 100 ml Iso
yvrose 300 IV contrast.  Delayed images through the kidneys and coronal/sagittal reconstructions perform
ed.

 

CT DLP: 918.7 mGycm

Automated exposure control for dose reduction was used.

 

 

FINDINGS:

Heart normal size without pericardial effusion. Lung bases clear without pleural effusion.

 

Small hiatal hernia.

 

No focal liver lesion or biliary ductal dilatation. Portal venous system is patent.

 

Cholecystectomy clips.

 

Adrenal glands, spleen, small hilar splenule, and pancreas appear within normal limits.

 

4.5 cm left upper to midpole renal cyst versus 3.5 cm, previously.

 

There is mild bilateral pelvicaliectasis and mild fullness of the bilateral ureters.

 

No dilated small bowel, free fluid, or free air. No mesenteric or retroperitoneal lymphadenopathy.

 

Normal appendix. Oral contrast progressed to the distal transverse colon. No significant stool burden
. Mild to moderate stool within the distal sigmoid and rectum and some oral contrast material seen he
re as well.

 

Marked distention of the urinary bladder up to 12.2 cm wide filling the pelvis identified and 13.2 cm
 craniocaudal.

 

Uterus is anteverted. Both ovaries are visualized. There is a crenulated, peripherally enhancing cyst
ic appearing structure measuring 1.6 cm in the left ovary. In addition, there may be a 2.3 cm additio
nal cystic structure of the left ovary. No abnormal fluid collection in the pelvis or pelvic lymphade
nopathy.

 

Bones: Mild degenerative spurring at both hips. No osseous destructive process. Mild degenerative dis
c disease lower thoracic spine.

 

 

 

 

 

IMPRESSION: 

 

1. PROMINENT DISTENTION OF THE URINARY BLADDER FILLING THE PELVIS SIDE TO SIDE AND UP TO 13.2 CM CRAN
IOCAUDAL. GIVEN MILD PELVICALIECTASIS/FULLNESS OF THE RENAL COLLECTING SYSTEMS, CORRELATE TO EXCLUDE 
BLADDER OUTLET OBSTRUCTION.

 

2. SUSPECT A 1.6 CM CORPUS LUTEUM LEFT OVARY AND PROBABLE ADDITIONAL 2.3 CM LEFT OVARIAN DOMINANT FOL
LICLE. PELVIC ULTRASOUND FOLLOW-UP IN 6-8 WEEKS TO BETTER CHARACTERIZE AND ASSESS FOR INVOLUTION.

 

3. A BENIGN LEFT RENAL CYST HAS GROWN IN THE INTERVAL NOW MEASURING 4.5 CM VERSUS 3.5 CM IN 2017.

 

4. SMALL HIATAL HERNIA.

## 2022-02-21 ENCOUNTER — HOSPITAL ENCOUNTER (OUTPATIENT)
Dept: HOSPITAL 47 - RADUSWWP | Age: 47
Discharge: HOME | End: 2022-02-21
Attending: FAMILY MEDICINE
Payer: COMMERCIAL

## 2022-02-21 DIAGNOSIS — N13.30: Primary | ICD-10-CM

## 2022-02-21 PROCEDURE — 76857 US EXAM PELVIC LIMITED: CPT

## 2022-02-21 NOTE — US
EXAMINATION TYPE: US bladder

 

DATE OF EXAM: 2/21/2022

 

COMPARISON: CT 2/4/22, 3/28/17  

 

CLINICAL HISTORY: N32.0 Bladder outlet obstruction.

 

EXAM MEASUREMENTS:

 

Post Void Residual Volume:  31.1 mL. Normal less than 50 mL.

 

There is a cyst at the superior pole left kidney. Some mild prominence of the right renal collecting 
system is noted.

 

Color Doppler performed to assess ureteral jets.

** Bilateral Jets seen:  Yes

 

** Normal Post Void Residual (less than 50ml):  Yes

 

Bladder wnl. Urinary bladder appears sonolucent. The posterior wall is normal. Ureteral jets appear u
nremarkable.

 

IMPRESSION:  

1. Normal ultrasound of urinary bladder. No significant post void residual.

2. Mild right hydronephrosis.

## 2022-03-24 ENCOUNTER — HOSPITAL ENCOUNTER (OUTPATIENT)
Dept: HOSPITAL 47 - RADUSWWP | Age: 47
Discharge: HOME | End: 2022-03-24
Attending: FAMILY MEDICINE
Payer: COMMERCIAL

## 2022-03-24 DIAGNOSIS — Q51.810: Primary | ICD-10-CM

## 2022-03-24 PROCEDURE — 76830 TRANSVAGINAL US NON-OB: CPT

## 2022-03-24 NOTE — US
EXAMINATION TYPE: US transvaginal

 

DATE OF EXAM: 3/24/2022

 

COMPARISON: US, CT

 

CLINICAL HISTORY: N83.299. Complex ovarian cyst. Hx 3 C Sections. . 

 

TECHNIQUE:  Transvaginal (TV).  

 

Date of LMP:  2022 

 

EXAM MEASUREMENTS:

 

Uterus:  9.6 x 5.9 x 5.3 cm

Endometrial Stripe: Arcuate appearance of uterus. Right upper endometrium: 0.5 cm. Left upper endomet
rium: 0.7 cm.

Right Ovary:  3.5 x 2.2 x 2.1 cm

Left Ovary:  2.8 x 1.5 x 1.4 cm

 

 

1. Uterus:  Anteverted   Appears heterogeneous.

2. Endometrium:  Fluid seen within cervix: 1.7 x 0.8 x 0.3 cm. Left endo appears slightly thicker for
 Day 13

3. Right Ovary:  Anechoic area seen: 1.0 x 1.1 x 1.2 cm.. Previous 2.3 cm right ovarian cyst is not i
dentified.

4. Left Ovary:  Subcentimeter anechoic areas seen. Previous 1.6 mL left ovarian cyst not identified.

**Spectral, color and waveform doppler imaging shows good arterial and venous flow within the right o
vary. There is no evidence for ovarian torsion within the right ovary.- Flow assessed due to patient'
s shooting pain within the right pelvic area.

5. Bilateral Adnexa:  Prominent vascularity within the left adnexa.

6. Posterior cul-de-sac:  Fluid seen.

 

 

 

IMPRESSION: 

1. Some fluid within the endometrial canals of an arcuate uterus.

2. No suspicious ovarian cysts.

## 2022-05-27 ENCOUNTER — HOSPITAL ENCOUNTER (OUTPATIENT)
Dept: HOSPITAL 47 - RADMAMWWP | Age: 47
Discharge: HOME | End: 2022-05-27
Attending: OBSTETRICS & GYNECOLOGY
Payer: COMMERCIAL

## 2022-05-27 DIAGNOSIS — Z80.3: ICD-10-CM

## 2022-05-27 DIAGNOSIS — Z12.31: Primary | ICD-10-CM

## 2022-05-27 PROCEDURE — 77067 SCR MAMMO BI INCL CAD: CPT

## 2022-05-31 NOTE — MM
Reason for Exam: Screening  (asymptomatic). 

Last screening mammogram was performed 12 month(s) ago.





Patient History: 

Menarche at age 13. First Full-Term Pregnancy at age 26. 3/28/2008, Benign Core Biopsy on the left

side. 3/28/2008, Bilateral Benign Core Biopsy.

Maternal aunt had breast cancer.

Last menstrual period: 05/25/2022





Risk Values: 

Lulu 5 year model risk: 2.4%.

NCI Lifetime model risk: 16.4%.





Film Views: 

Bilateral CC views were taken.

Bilateral MLO views were taken.





Prior Study Comparison: 

1/7/2019 Right Diagnostic Mammogram, [facility]. 7/8/2019 Bilateral Screening Mammogram, [facility]. 5/26/2021

Bilateral Screening Mammogram, [facility]. 





Tissue Density: 

The breast tissue is heterogeneously dense. This may lower the sensitivity of mammography.





Findings: 

Analyzed By CAD. 

2 microclips within the left breast from prior biopsies. Chronic nodularity lateral left breast.

Additional areas of asymmetric density remain unchanged.



No significant change from prior exams. 





Overall Assessment: Benign, BI-RAD 2





Management: 

Screening Mammogram of both breasts in 1 year.

A clinical breast exam by your physician is recommended on an annual basis and results should be

correlated with mammographic findings.  Also, the patient should continue monthly self breast exams.





Electronically signed and approved by: Kylie Lainez M.D. Radiologist

## 2022-09-13 ENCOUNTER — HOSPITAL ENCOUNTER (OUTPATIENT)
Dept: HOSPITAL 47 - RADCTMAIN | Age: 47
Discharge: HOME | End: 2022-09-13
Payer: COMMERCIAL

## 2022-09-13 DIAGNOSIS — R22.1: Primary | ICD-10-CM

## 2022-09-13 PROCEDURE — 70491 CT SOFT TISSUE NECK W/DYE: CPT

## 2022-09-13 NOTE — CT
EXAMINATION TYPE: CT soft tissue neck w con

 

DATE OF EXAM: 9/13/2022

 

HISTORY: Swelling, mass, lump in neck after eating.

 

COMPARISON: NONE

 

CT DLP: 653 mGycm.  Automated Exposure Control for Dose Reduction was Utilized.

 

TECHNIQUE:  CT scan of the neck is performed with IV Contrast, patient injected with 70 ml mL of Isov
ue 300, axial images are obtained, coronal and sagittal reformatted images are reviewed.

 

FINDINGS:

 

Airway: No gross abnormality seen.

 

Parotid/submandibular glands:  No gross abnormality seen.

 

Carotid/Vascular Structures: No significant finding. 

 

Osseous Structures: No suspicious finding. 

 

Other: No definitive abnormal greater than 1 cm neck adenopathy

 

IMPRESSION:  No significant abnormality is seen. Unremarkable study. Cephalexin Counseling: I counseled the patient regarding use of cephalexin as an antibiotic for prophylactic and/or therapeutic purposes. Cephalexin (commonly prescribed under brand name Keflex) is a cephalosporin antibiotic which is active against numerous classes of bacteria, including most skin bacteria. Side effects may include nausea, diarrhea, gastrointestinal upset, rash, hives, yeast infections, and in rare cases, hepatitis, kidney disease, seizures, fever, confusion, neurologic symptoms, and others. Patients with severe allergies to penicillin medications are cautioned that there is about a 10% incidence of cross-reactivity with cephalosporins. When possible, patients with penicillin allergies should use alternatives to cephalosporins for antibiotic therapy.

## 2022-09-20 ENCOUNTER — HOSPITAL ENCOUNTER (OUTPATIENT)
Dept: HOSPITAL 47 - LABWHC1 | Age: 47
Discharge: HOME | End: 2022-09-20
Payer: COMMERCIAL

## 2022-09-20 DIAGNOSIS — R53.83: Primary | ICD-10-CM

## 2022-09-20 DIAGNOSIS — R68.2: ICD-10-CM

## 2022-09-20 PROCEDURE — 86235 NUCLEAR ANTIGEN ANTIBODY: CPT

## 2022-09-20 PROCEDURE — 82330 ASSAY OF CALCIUM: CPT

## 2022-09-20 PROCEDURE — 36415 COLL VENOUS BLD VENIPUNCTURE: CPT

## 2023-06-02 ENCOUNTER — HOSPITAL ENCOUNTER (OUTPATIENT)
Dept: HOSPITAL 47 - RADMAMWWP | Age: 48
Discharge: HOME | End: 2023-06-02
Attending: OBSTETRICS & GYNECOLOGY
Payer: COMMERCIAL

## 2023-06-02 DIAGNOSIS — Z80.3: ICD-10-CM

## 2023-06-02 DIAGNOSIS — Z12.31: Primary | ICD-10-CM

## 2023-06-02 DIAGNOSIS — Z78.0: ICD-10-CM

## 2023-06-02 PROCEDURE — 77067 SCR MAMMO BI INCL CAD: CPT

## 2023-06-02 PROCEDURE — 77063 BREAST TOMOSYNTHESIS BI: CPT

## 2023-06-05 NOTE — MM
Reason for Exam: Screening  (asymptomatic). 

Last mammogram was performed 1 year(s) and 1 month(s) ago. 





Patient History: 

Menarche at age 13. First Full-Term Pregnancy at age 26. 3/28/2008, Benign Core Biopsy on the left

side. 3/28/2008, Bilateral Benign Core Biopsy.

Maternal aunt had breast cancer. 





Risk Values: 

Lulu 5 year model risk: 2.3%.

NCI Lifetime model risk: 16.0%.





Prior Study Comparison: 

7/8/2019 Bilateral Screening Mammogram, [facility]. 5/26/2021 Bilateral Screening Mammogram, [facility]. 5/27/2022

Bilateral MG screening mammo w CAD, [facility]. 





Tissue Density: 

The breast tissue is heterogeneously dense. This may lower the sensitivity of mammography.





Findings: 

Analyzed By CAD. 

There are 2 biopsy clips redemonstrated scattered throughout the left breast. There is stable 8 mm

oval circumscribed mass centrally in the left breast.



There is no suspicious group of microcalcifications or new suspicious mass in either breast. 





Overall Assessment: Benign, BI-RAD 2





Management: 

Screening Mammogram of both breasts in 1 year.

.



Patient should continue monthly self-breast exams.  A clinical breast exam by your physician is

recommended on an annual basis.

This exam should not preclude additional follow-up of suspicious palpable abnormalities.



Note on Lulu scores and lifetime risk:

1. A Lulu score greater than 3% is considered moderate risk. If this is the case, consider

specialist referral to assess eligibility for a risk reducing agent.

2. If overall lifetime risk for the development of breast cancer is 20% or higher, the patient may

qualify for future screening with alternating mammogram and breast MRI.



Electronically signed and approved by: Eulalio Torres M.D.

## 2023-10-06 ENCOUNTER — HOSPITAL ENCOUNTER (OUTPATIENT)
Dept: HOSPITAL 47 - LABWHC1 | Age: 48
Discharge: HOME | End: 2023-10-06
Attending: PEDIATRICS
Payer: COMMERCIAL

## 2023-10-06 DIAGNOSIS — E03.9: Primary | ICD-10-CM

## 2023-10-06 DIAGNOSIS — E06.3: ICD-10-CM

## 2023-10-06 LAB — T4 FREE SERPL-MCNC: 1.4 NG/DL (ref 0.8–1.8)

## 2023-10-06 PROCEDURE — 36415 COLL VENOUS BLD VENIPUNCTURE: CPT

## 2023-10-06 PROCEDURE — 84436 ASSAY OF TOTAL THYROXINE: CPT

## 2023-10-06 PROCEDURE — 84442 ASSAY OF THYROID ACTIVITY: CPT

## 2023-10-06 PROCEDURE — 84443 ASSAY THYROID STIM HORMONE: CPT

## 2023-10-06 PROCEDURE — 84482 T3 REVERSE: CPT

## 2023-10-06 PROCEDURE — 84439 ASSAY OF FREE THYROXINE: CPT

## 2023-10-06 PROCEDURE — 86376 MICROSOMAL ANTIBODY EACH: CPT

## 2024-03-13 ENCOUNTER — HOSPITAL ENCOUNTER (OUTPATIENT)
Dept: HOSPITAL 47 - RADUSWWP | Age: 49
Discharge: HOME | End: 2024-03-13
Attending: FAMILY MEDICINE
Payer: COMMERCIAL

## 2024-03-13 DIAGNOSIS — N28.1: Primary | ICD-10-CM

## 2024-03-13 PROCEDURE — 76700 US EXAM ABDOM COMPLETE: CPT

## 2024-03-13 NOTE — US
EXAMINATION TYPE: US abdomen complete

 

DATE OF EXAM: 3/13/2024

 

COMPARISON: CT 2/4/2022

 

CLINICAL INDICATION: Female, 48 years old with history of R10.9 UNSPECIFIED ABDOMINAL PAIN; pain righ
t side

 

TECHNIQUE: Multiple sonographic images of the abdomen are obtained.

 

FINDINGS:

 

EXAM MEASUREMENTS:

 

Liver Length:  15.9 cm   

Gallbladder:  Surgically absent   

CBD:  .7 cm

Spleen:  10 cm   

Right Kidney:  10.2 x 4.6 x 5.4  cm 

Left Kidney:  11.3 x 5.1 x 4.8  cm   

 

Pancreas:  wnl

Liver:  wnl  

Gallbladder:  Surgically absent

**Evidence for sonographic Baires's sign:  no

CBD:  wnl 

Spleen:  wnl   

Right Kidney:  wnl   

Left Kidney:  Anechoic area upper pole 6.6 x 5.4 x 4.5 cm.   Increased from 4.9 cm back on the 2022 C
T. No hydronephrosis.

Upper IVC:  wnl  

Abd Aorta:  wnl

 

 

IMPRESSION: 

 

1. Bile duct borderline to mildly dilated at 7 mm, likely due to postcholecystectomy status. Correlat
e with alkaline phosphatase and bilirubin levels.

2. A benign upper pole cyst of the left kidney measuring 6.6 cm, increased from 4.9 cm back in 2022.

## 2024-04-23 ENCOUNTER — HOSPITAL ENCOUNTER (OUTPATIENT)
Dept: HOSPITAL 47 - LABWHC1 | Age: 49
Discharge: HOME | End: 2024-04-23
Attending: INTERNAL MEDICINE
Payer: COMMERCIAL

## 2024-04-23 DIAGNOSIS — R10.11: Primary | ICD-10-CM

## 2024-04-23 LAB
ALBUMIN SERPL-MCNC: 4.5 G/DL (ref 3.8–4.9)
ALBUMIN/GLOB SERPL: 1.73 RATIO (ref 1.6–3.17)
ALP SERPL-CCNC: 73 U/L (ref 41–126)
ALT SERPL-CCNC: 12 U/L (ref 8–44)
ANION GAP SERPL CALC-SCNC: 10.5 MMOL/L (ref 4–12)
AST SERPL-CCNC: 16 U/L (ref 13–35)
BASOPHILS # BLD AUTO: 0.04 X 10*3/UL (ref 0–0.1)
BASOPHILS NFR BLD AUTO: 0.6 %
BUN SERPL-SCNC: 11.9 MG/DL (ref 9–27)
BUN/CREAT SERPL: 14.88 RATIO (ref 12–20)
CALCIUM SPEC-MCNC: 9.6 MG/DL (ref 8.7–10.3)
CHLORIDE SERPL-SCNC: 101 MMOL/L (ref 96–109)
CO2 SERPL-SCNC: 25.5 MMOL/L (ref 21.6–31.8)
EOSINOPHIL # BLD AUTO: 0.1 X 10*3/UL (ref 0.04–0.35)
EOSINOPHIL NFR BLD AUTO: 1.6 %
ERYTHROCYTE [DISTWIDTH] IN BLOOD BY AUTOMATED COUNT: 4.58 X 10*6/UL (ref 4.1–5.2)
ERYTHROCYTE [DISTWIDTH] IN BLOOD: 13.2 % (ref 11.5–14.5)
GLOBULIN SER CALC-MCNC: 2.6 G/DL (ref 1.6–3.3)
GLUCOSE SERPL-MCNC: 91 MG/DL (ref 70–110)
HCT VFR BLD AUTO: 41.2 % (ref 37.2–46.3)
HGB BLD-MCNC: 13.8 G/DL (ref 12–15)
IMM GRANULOCYTES BLD QL AUTO: 0.3 %
LYMPHOCYTES # SPEC AUTO: 2 X 10*3/UL (ref 0.9–5)
LYMPHOCYTES NFR SPEC AUTO: 32.2 %
MCH RBC QN AUTO: 30.1 PG (ref 27–32)
MCHC RBC AUTO-ENTMCNC: 33.5 G/DL (ref 32–37)
MCV RBC AUTO: 90 FL (ref 80–97)
MONOCYTES # BLD AUTO: 0.5 X 10*3/UL (ref 0.2–1)
MONOCYTES NFR BLD AUTO: 8.1 %
NEUTROPHILS # BLD AUTO: 3.55 X 10*3/UL (ref 1.8–7.7)
NEUTROPHILS NFR BLD AUTO: 57.2 %
NRBC BLD AUTO-RTO: 0 X 10*3/UL (ref 0–0.01)
PLATELET # BLD AUTO: 207 X 10*3/UL (ref 140–440)
POTASSIUM SERPL-SCNC: 3.9 MMOL/L (ref 3.5–5.5)
PROT SERPL-MCNC: 7.1 G/DL (ref 6.2–8.2)
SODIUM SERPL-SCNC: 137 MMOL/L (ref 135–145)
WBC # BLD AUTO: 6.21 X 10*3/UL (ref 4.5–10)

## 2024-04-23 PROCEDURE — 80053 COMPREHEN METABOLIC PANEL: CPT

## 2024-04-23 PROCEDURE — 85025 COMPLETE CBC W/AUTO DIFF WBC: CPT

## 2024-04-23 PROCEDURE — 36415 COLL VENOUS BLD VENIPUNCTURE: CPT

## 2024-05-16 ENCOUNTER — HOSPITAL ENCOUNTER (OUTPATIENT)
Dept: HOSPITAL 47 - RADCTMAIN | Age: 49
Discharge: HOME | End: 2024-05-16
Attending: FAMILY MEDICINE
Payer: COMMERCIAL

## 2024-05-16 DIAGNOSIS — N28.1: Primary | ICD-10-CM

## 2024-05-16 DIAGNOSIS — K58.9: ICD-10-CM

## 2024-05-16 PROCEDURE — 74176 CT ABD & PELVIS W/O CONTRAST: CPT

## 2024-05-16 NOTE — CT
EXAMINATION TYPE: CT abdomen pelvis wo con

CT DLP: 951 mGycm, Automated exposure control for dose reduction was used.

 

DATE OF EXAM: 5/16/2024 12:16 PM

 

COMPARISON: CT abdomen pelvis most recent from  2/4/2022

 

CLINICAL INDICATION:Female, 48 years old with history of K58.9 IRRITABLE BOWEL SYNDROME WITHOUT DIARR
HEA; IBS without diarrhea, RT side abdomen pain for several months

 

TECHNIQUE:  Axial CT abdomen pelvis wo con;Sagittal and coronal reformats were created on a separate 
workstation. 

 

Contrast used: mL of , (none if empty)

Oral contrast used: without Oral Contrast (none if empty)

 

FINDINGS: 

LOWER CHEST: Unremarkable

 

ABDOMEN

LIVER: Unremarkable

GALLBLADDER AND BILE DUCTS: Cholecystectomy clips are present.

PANCREAS: Unremarkable.

SPLEEN: Unremarkable.

ADRENAL GLANDS: Unremarkable.

KIDNEYS AND URETERS: Simple appearing renal cysts on the left. No evidence of hydronephrosis or renal
 calculus. The ureters are unremarkable.  

 

PELVIS

BLADDER: Unremarkable

REPRODUCTIVE: Left ovarian dominant follicle measuring 24 mm.

 

ABDOMEN & PELVIS

STOMACH AND BOWEL: No evidence of bowel obstruction. Appendix is normal.

PERITONEUM/RETROPERITONEUM: No evidence of pneumoperitoneum or free fluid.

VASCULATURE: No evidence of aortic aneurysm. 

MUSCULOSKELETAL: No acute osseous abnormalities

LYMPH NODES: No gross evidence for lymphadenopathy.

SOFT TISSUE/ABDOMINAL WALL: Unremarkable

 

IMPRESSION:

1.  No evidence for acute abdominal process. No finding within the right abdomen to definitively mary
elate with patient's right-sided abdominal pain. Appendix is normal. No obstructive uropathy. No kostas
l calculi.

2.  Left ovarian dominant follicle measuring up to 24 mm.

3.  Left renal cyst which is a simple noncontrast appearance.

## 2024-07-26 ENCOUNTER — HOSPITAL ENCOUNTER (OUTPATIENT)
Dept: HOSPITAL 47 - RADMAMWWP | Age: 49
Discharge: HOME | End: 2024-07-26
Attending: FAMILY MEDICINE
Payer: COMMERCIAL

## 2024-07-26 DIAGNOSIS — Z80.3: ICD-10-CM

## 2024-07-26 DIAGNOSIS — Z12.31: Primary | ICD-10-CM

## 2024-07-26 DIAGNOSIS — R92.333: ICD-10-CM

## 2024-07-26 PROCEDURE — 77063 BREAST TOMOSYNTHESIS BI: CPT

## 2024-07-26 PROCEDURE — 77067 SCR MAMMO BI INCL CAD: CPT

## 2024-07-29 NOTE — MM
Reason for Exam: Screening  (asymptomatic). 

Last mammogram was performed 1 year(s) and 1 month(s) ago. 





Patient History: 

Menarche at age 13. First Full-Term Pregnancy at age 26. 3/28/2008, Benign Core Biopsy on the left

side. 3/28/2008, Bilateral Benign Core Biopsy.

Maternal aunt had breast cancer. 





Risk Values: 

Lulu 5 year model risk: 2.1%.

NCI Lifetime model risk: 15.6%.





Prior Study Comparison: 

5/26/2021 Bilateral Screening Mammogram, [facility]. 5/27/2022 Bilateral MG screening mammo w CAD, [facility].

6/2/2023 Bilateral MG 3D screening mammo w/cad, [facility]. 





Tissue Density: 

The breasts are heterogeneously dense, which may obscure small masses.





Findings: 

Analyzed By CAD. 

There is no suspicious group of microcalcifications or new suspicious mass in either breast. 





Overall Assessment: Negative, BI-RAD 1





Management: 

Screening Mammogram of both breasts in 1 year.

.



Patient should continue monthly self-breast exams.  A clinical breast exam by your physician is

recommended on an annual basis.

This exam should not preclude additional follow-up of suspicious palpable abnormalities.



Note on Lulu scores and lifetime risk:

1. A Lulu score greater than 3% is considered moderate risk. If this is the case, consider

specialist referral to assess eligibility for a risk reducing agent.

2. If overall lifetime risk for the development of breast cancer is 20% or higher, the patient may

qualify for future screening with alternating mammogram and breast MRI.



Electronically signed and approved by: Leopold M. Fregoli, M.D. Radiologis

## 2024-10-07 ENCOUNTER — HOSPITAL ENCOUNTER (OUTPATIENT)
Dept: HOSPITAL 47 - LABWHC1 | Age: 49
Discharge: HOME | End: 2024-10-07
Attending: STUDENT IN AN ORGANIZED HEALTH CARE EDUCATION/TRAINING PROGRAM
Payer: COMMERCIAL

## 2024-10-07 DIAGNOSIS — K58.9: Primary | ICD-10-CM

## 2024-10-07 LAB
ALBUMIN SERPL-MCNC: 4.2 G/DL (ref 3.8–4.9)
ALBUMIN/GLOB SERPL: 1.62 RATIO (ref 1.6–3.17)
ALP SERPL-CCNC: 66 U/L (ref 41–126)
ALT SERPL-CCNC: 23 U/L (ref 8–44)
ANION GAP SERPL CALC-SCNC: 12.1 MMOL/L (ref 4–12)
AST SERPL-CCNC: 37 U/L (ref 13–35)
BUN SERPL-SCNC: 9.8 MG/DL (ref 9–27)
BUN/CREAT SERPL: 12.25 RATIO (ref 12–20)
CALCIUM SPEC-MCNC: 8.9 MG/DL (ref 8.7–10.3)
CHLORIDE SERPL-SCNC: 105 MMOL/L (ref 96–109)
CHOLEST SERPL-MCNC: 152 MG/DL (ref 0–200)
CO2 SERPL-SCNC: 22.9 MMOL/L (ref 21.6–31.8)
GLOBULIN SER CALC-MCNC: 2.6 G/DL (ref 1.6–3.3)
GLUCOSE SERPL-MCNC: 96 MG/DL (ref 70–110)
HDLC SERPL-MCNC: 56.6 MG/DL (ref 40–60)
LDLC SERPL CALC-MCNC: 82.7 MG/DL (ref 0–131)
POTASSIUM SERPL-SCNC: 4 MMOL/L (ref 3.5–5.5)
PROT SERPL-MCNC: 6.8 G/DL (ref 6.2–8.2)
SODIUM SERPL-SCNC: 140 MMOL/L (ref 135–145)
TRIGL SERPL-MCNC: 63.7 MG/DL (ref 0–149)
VIT B12 SERPL-MCNC: 885 PG/ML (ref 200–944)
VLDLC SERPL CALC-MCNC: 12.74 MG/DL (ref 5–40)

## 2024-10-07 PROCEDURE — 36415 COLL VENOUS BLD VENIPUNCTURE: CPT

## 2024-10-07 PROCEDURE — 82607 VITAMIN B-12: CPT

## 2024-10-07 PROCEDURE — 86038 ANTINUCLEAR ANTIBODIES: CPT

## 2024-10-07 PROCEDURE — 80061 LIPID PANEL: CPT

## 2024-10-07 PROCEDURE — 84443 ASSAY THYROID STIM HORMONE: CPT

## 2024-10-07 PROCEDURE — 82306 VITAMIN D 25 HYDROXY: CPT

## 2024-10-07 PROCEDURE — 80053 COMPREHEN METABOLIC PANEL: CPT

## 2024-10-07 PROCEDURE — 85730 THROMBOPLASTIN TIME PARTIAL: CPT

## 2024-10-07 PROCEDURE — 85613 RUSSELL VIPER VENOM DILUTED: CPT

## 2024-10-08 LAB
APTT BLD: 40 SEC(S) (ref ?–43)
SCREEN DRVVT: 33 SEC(S) (ref ?–44)

## 2024-12-07 ENCOUNTER — HOSPITAL ENCOUNTER (OUTPATIENT)
Dept: HOSPITAL 47 - EC | Age: 49
Setting detail: OBSERVATION
LOS: 2 days | Discharge: HOME | End: 2024-12-09
Attending: FAMILY MEDICINE | Admitting: FAMILY MEDICINE
Payer: COMMERCIAL

## 2024-12-07 DIAGNOSIS — R20.2: ICD-10-CM

## 2024-12-07 DIAGNOSIS — Z90.49: ICD-10-CM

## 2024-12-07 DIAGNOSIS — Z79.899: ICD-10-CM

## 2024-12-07 DIAGNOSIS — K21.9: ICD-10-CM

## 2024-12-07 DIAGNOSIS — E80.6: ICD-10-CM

## 2024-12-07 DIAGNOSIS — F41.9: ICD-10-CM

## 2024-12-07 DIAGNOSIS — M54.6: ICD-10-CM

## 2024-12-07 DIAGNOSIS — I08.1: ICD-10-CM

## 2024-12-07 DIAGNOSIS — R07.89: Primary | ICD-10-CM

## 2024-12-07 DIAGNOSIS — F32.A: ICD-10-CM

## 2024-12-07 LAB
ALBUMIN SERPL-MCNC: 4.5 G/DL (ref 3.5–5)
ALP SERPL-CCNC: 58 U/L (ref 38–126)
ALT SERPL-CCNC: 15 U/L (ref 4–34)
AMYLASE SERPL-CCNC: 62 U/L (ref 30–110)
ANION GAP SERPL CALC-SCNC: 9 MMOL/L
APTT BLD: 24.9 SEC (ref 22–30)
AST SERPL-CCNC: 29 U/L (ref 14–36)
BASOPHILS # BLD AUTO: 0 K/UL (ref 0–0.2)
BASOPHILS NFR BLD AUTO: 1 %
BUN SERPL-SCNC: 7 MG/DL (ref 7–17)
CALCIUM SPEC-MCNC: 9 MG/DL (ref 8.4–10.2)
CHLORIDE SERPL-SCNC: 105 MMOL/L (ref 98–107)
CO2 SERPL-SCNC: 24 MMOL/L (ref 22–30)
EOSINOPHIL # BLD AUTO: 0.1 K/UL (ref 0–0.7)
EOSINOPHIL NFR BLD AUTO: 3 %
ERYTHROCYTE [DISTWIDTH] IN BLOOD BY AUTOMATED COUNT: 4.7 M/UL (ref 3.8–5.4)
ERYTHROCYTE [DISTWIDTH] IN BLOOD: 12.9 % (ref 11.5–15.5)
GLUCOSE SERPL-MCNC: 98 MG/DL (ref 74–99)
HCT VFR BLD AUTO: 42.5 % (ref 34–46)
HGB BLD-MCNC: 14 GM/DL (ref 11.4–16)
INR PPP: 0.9 (ref ?–1.2)
LIPASE SERPL-CCNC: 88 U/L (ref 23–300)
LYMPHOCYTES # SPEC AUTO: 1.1 K/UL (ref 1–4.8)
LYMPHOCYTES NFR SPEC AUTO: 21 %
MAGNESIUM SPEC-SCNC: 1.8 MG/DL (ref 1.6–2.3)
MCH RBC QN AUTO: 29.9 PG (ref 25–35)
MCHC RBC AUTO-ENTMCNC: 33 G/DL (ref 31–37)
MCV RBC AUTO: 90.6 FL (ref 80–100)
MONOCYTES # BLD AUTO: 0.3 K/UL (ref 0–1)
MONOCYTES NFR BLD AUTO: 6 %
NEUTROPHILS # BLD AUTO: 3.5 K/UL (ref 1.3–7.7)
NEUTROPHILS NFR BLD AUTO: 68 %
PLATELET # BLD AUTO: 250 K/UL (ref 150–450)
POTASSIUM SERPL-SCNC: 4.4 MMOL/L (ref 3.5–5.1)
PROT SERPL-MCNC: 7.5 G/DL (ref 6.3–8.2)
PT BLD: 10.4 SEC (ref 10–12.5)
SODIUM SERPL-SCNC: 138 MMOL/L (ref 137–145)
WBC # BLD AUTO: 5.2 K/UL (ref 3.8–10.6)

## 2024-12-07 PROCEDURE — 80053 COMPREHEN METABOLIC PANEL: CPT

## 2024-12-07 PROCEDURE — 93306 TTE W/DOPPLER COMPLETE: CPT

## 2024-12-07 PROCEDURE — 99285 EMERGENCY DEPT VISIT HI MDM: CPT

## 2024-12-07 PROCEDURE — 85379 FIBRIN DEGRADATION QUANT: CPT

## 2024-12-07 PROCEDURE — 96372 THER/PROPH/DIAG INJ SC/IM: CPT

## 2024-12-07 PROCEDURE — 84484 ASSAY OF TROPONIN QUANT: CPT

## 2024-12-07 PROCEDURE — 83690 ASSAY OF LIPASE: CPT

## 2024-12-07 PROCEDURE — 71046 X-RAY EXAM CHEST 2 VIEWS: CPT

## 2024-12-07 PROCEDURE — 84443 ASSAY THYROID STIM HORMONE: CPT

## 2024-12-07 PROCEDURE — 82248 BILIRUBIN DIRECT: CPT

## 2024-12-07 PROCEDURE — 85027 COMPLETE CBC AUTOMATED: CPT

## 2024-12-07 PROCEDURE — 76705 ECHO EXAM OF ABDOMEN: CPT

## 2024-12-07 PROCEDURE — 85610 PROTHROMBIN TIME: CPT

## 2024-12-07 PROCEDURE — 85730 THROMBOPLASTIN TIME PARTIAL: CPT

## 2024-12-07 PROCEDURE — 83735 ASSAY OF MAGNESIUM: CPT

## 2024-12-07 PROCEDURE — 93351 STRESS TTE COMPLETE: CPT

## 2024-12-07 PROCEDURE — 72128 CT CHEST SPINE W/O DYE: CPT

## 2024-12-07 PROCEDURE — 72131 CT LUMBAR SPINE W/O DYE: CPT

## 2024-12-07 PROCEDURE — 85025 COMPLETE CBC W/AUTO DIFF WBC: CPT

## 2024-12-07 PROCEDURE — 80061 LIPID PANEL: CPT

## 2024-12-07 PROCEDURE — 93005 ELECTROCARDIOGRAM TRACING: CPT

## 2024-12-07 PROCEDURE — 82150 ASSAY OF AMYLASE: CPT

## 2024-12-07 PROCEDURE — 36415 COLL VENOUS BLD VENIPUNCTURE: CPT

## 2024-12-07 RX ADMIN — ASPIRIN 81 MG CHEWABLE TABLET STA MG: 81 TABLET CHEWABLE at 08:15

## 2024-12-07 RX ADMIN — NITROGLYCERIN SCH: 20 OINTMENT TOPICAL at 13:57

## 2024-12-07 RX ADMIN — ACETAMINOPHEN ONE ML: 160 SOLUTION ORAL at 11:49

## 2024-12-07 RX ADMIN — NITROGLYCERIN STA: 0.4 TABLET SUBLINGUAL at 09:15

## 2024-12-07 RX ADMIN — HEPARIN SODIUM SCH: 5000 INJECTION, SOLUTION INTRAVENOUS; SUBCUTANEOUS at 18:27

## 2024-12-07 RX ADMIN — ATORVASTATIN CALCIUM SCH MG: 40 TABLET, FILM COATED ORAL at 20:14

## 2024-12-07 NOTE — CT
EXAMINATION TYPE: CT thor lumbar spine wo con

 

DATE OF EXAM: 12/7/2024

 

COMPARISON: None

 

CLINICAL INDICATION: Female, 49 years old with history of back pain, tingling left arm; PHH, BACK RADHA
N

 

CT DLP: 1112.9 mGycm

Automated exposure control for dose reduction was used.

 

FINDINGS: 

 

 

The thoracic and lumbar vertebral segments are normal in height and alignment and there is no fractur
e or subluxation.

 

In the mid to lower thoracic spine there is mild spondylosis indicating mild degenerative disc diseas
e. The lumbar disc spaces are well preserved and there is no significant degenerative disc disease. T
here are no large thoracic or lumbar disc herniations. There is no spinal stenosis.

 

There is no bony neural foraminal stenosis. There is mild facet arthropathy lower lumbar spine. Tim
luis soft tissues are unremarkable.

 

IMPRESSION:

 

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 Mild facet arthropathy in t
he lumbar spine. No other significant abnormality seen.

 

[facility], Workstation: [email or web address] 12/7/2024 11:27 AM

## 2024-12-07 NOTE — P.HPIM
History of Present Illness


H&P Date: 24





History of Presenting Illness:





Patient is a very pleasant 49-year-old female with a past medical history of 

GERD and anxiety.  She presented to the emergency department with a chief 

complaint of chest pain and palpitations.  Patient reports over the past month 

she has been experiencing intermittent chest pain and palpitations.  Patient 

reports pain is to her midsternal chest radiating into her back and between her 

shoulder blades.  Patient reports occasionally she will have pain radiate into 

her left arm accompanied by tingling.  Patient reports longstanding history of 

palpitations since the birth of her son but states she was worked up for this 

years ago and even wore an event monitor for 7 days but they did not find 

anything.  Patient states over the past month this has worsened and has woken 

her from a sound sleep, occurred at rest, and with exertion.  She denies any 

known cardiac history or family history of sudden cardiac death before the age 

of 60.  Patient denies having any headache, lightheadedness, dizziness, 

shortness of breath, cough or congestion, or experiencing any swelling in her 

lower extremities.  Upon arrival to our facility, patient underwent evaluation 

in the emergency department.  Vital signs upon arrival show blood pressure 

143/75, heart rate 87, respiratory rate 18, temp 98.2 F, and SpO2 of 97% on 

room air.  EKG completed showing normal sinus rhythm at 85 bpm with no 

significant T wave or ST abnormality showing no signs of acute ischemia upon 

personal review and interpretation.  Chest x-ray negative for acute 

cardiopulmonary process.  Labs completed and reviewed.  CBC unremarkable.  

Coagulation profile normal findings.  D-dimer 0.38.  BMP unremarkable.  Blood 

glucose 98.  Magnesium normal findings at 1.8.  Liver profile showing 

hyperbilirubinemia with total bili of 1.7 otherwise normal findings.  Troponin 

was negative at less than 0.012.  Amylase and lipase normal findings.  Patient 

admitted under services with consultation to cardiology.





Review of systems:





Pertinent positives and negatives as discussed in HPI, a complete review of 

systems was performed and all other systems are negative.





Physical exam:





Vital signs reviewed and stable. 


General: Nontoxic, no distress and appears stated age.  


Derm: Skin warm and dry, normal coloration for ethnicity.


Head: Atraumatic, normocephalic and symmetric.  


Eyes: EOM's intact, no lid lag, and anicteric sclera


Mouth: no lip lesions, mucus membranes moist


Cardiovascular: regular rate and rhythm with normal S1S2, no murmur, positive 

posterior tibial pulses bilaterally, and cap refill < 2 seconds.  


Lungs: Respirations even, regular, and unlabored on room air. Lungs CTA 

bilaterally, no rhonchi, no rales, no wheezing, and no accessory muscle usage. 


Abdominal: soft, nontender to palpation, no guarding, no appreciable 

organomegaly


Ext: ROM intact. No gross muscle atrophy, no edema, no contractures


Neuro: Speech clear, face symmetrical and CN II-XII grossly intact with no noted

focal neuro deficits


Psych: Alert and oriented to person, place, time, and situation. Appropriate and

pleasant affect. 





Assessment and Plan of Care:





Chest pain and palpitations, rule out acute coronary event 


Back pain, between shoulder blades


Hyperbilirubinemia


GERD


Anxiety


-Cardiology consulted, appreciate recommendations


-Order placed for CT thoracic spine


-Telemetry monitoring


-Trend troponins


-Aspirin 81 mg daily and atorvastatin 40 mg nightly.


-Lipid profile with a.m. labs. 


-Echocardiogram


-Order placed for ultrasound of liver/common bile duct for evaluation as patient

does report previous incident of dilated common bile duct after laparoscopic 

cholecystectomy.


-Order placed for TSH secondary to reports of palpitations, we will also repeat 

morning CBC, CMP, and magnesium levels along with fractionated bilirubin for 

further evaluation.


-Patient to continue daily medication regimen with Wellbutrin 150 mg daily and 

Protonix 40 mg daily.





Data and imaging reviewed:





 As stated above in HPI








CODE STATUS:  Full Code


DVT prophylaxis: Heparin


Anticipated discharge date: Pending clinical course


Anticipated discharge place: Home


Patient was seen independently by Nurse Practitioner.


This document was prepared using Dragon dictation software.  Please allow for 

errors in transcription while rare they do occur.





Jan Meléndez NP rendered care for this patient independently, reviewed the 

findings and plan as documented in the note above and agree with plan.  I did 

not physically speak with or examine the patient on this date.





Past Medical History


Past Medical History: GERD/Reflux


Additional Past Medical History / Comment(s): DIARRHEA, epigastric pain- right 

after eating


History of Any Multi-Drug Resistant Organisms: None Reported


Past Surgical History:  Section, Cholecystectomy


Additional Past Surgical History / Comment(s): COLONOSCOPY, EGD, C/S x 3


Past Anesthesia/Blood Transfusion Reactions: No Reported Reaction


Past Psychological History: Anxiety, Depression


Smoking Status: Never smoker


Past Alcohol Use History: Rare


Past Drug Use History: None Reported





- Past Family History


  ** Father


Family Medical History: Cancer





Medications and Allergies


                                Home Medications











 Medication  Instructions  Recorded  Confirmed  Type


 


Omeprazole [PriLOSEC] 40 mg PO DAILY 24 History


 


buPROPion XL [Wellbutrin XL] 150 mg PO DAILY 24 History








                                    Allergies











Allergy/AdvReac Type Severity Reaction Status Date / Time


 


No Known Allergies Allergy   Verified 24 10:21














Physical Exam


Vitals: 


                                   Vital Signs











  Temp Pulse Resp BP Pulse Ox


 


 24 10:21  98.4 F  94  16  106/84  96


 


 24 09:03   78  16  96/81  96


 


 24 08:38   84  16  101/87  97


 


 24 08:16   92  18  110/77  96


 


 24 07:49  98.2 F  87  18  143/75  97








                                Intake and Output











 24





 22:59 06:59 14:59


 


Other:   


 


  Weight   79.379 kg














Results


CBC & Chem 7: 


                                 24 08:12





                                 24 08:12


Labs: 


                  Abnormal Lab Results - Last 24 Hours (Table)











  24 Range/Units





  08:12 


 


Total Bilirubin  1.7 H  (0.2-1.3)  mg/dL

## 2024-12-07 NOTE — ED
General Adult HPI





- General


Chief complaint: Chest Pain


Stated complaint: Chest pain


Time Seen by Provider: 24 07:52


Source: patient, RN notes reviewed


Mode of arrival: ambulatory


Limitations: no limitations





- History of Present Illness


Initial comments: 





Patient is a 49-year-old female present to the emergency department with 

concerns with chest discomfort.  Onset of symptoms was around 10 days ago.  

Discomfort feels like burning.  There is some discomfort of the back as well.  

Patient does have worsening symptoms with exertion and he has some exertional 

dyspnea.  Patient unclear if she has associated nausea as she does have some 

chronic stomach problems.  Patient has felt a little bit sweaty with her 

symptoms.  Discomfort is currently rated 4/10.





- Related Data


                                Home Medications











 Medication  Instructions  Recorded  Confirmed


 


Vortioxetine Hydrobromide 10 mg PO HS 21





[Trintellix]   











                                    Allergies











Allergy/AdvReac Type Severity Reaction Status Date / Time


 


No Known Allergies Allergy   Verified 24 07:48














Review of Systems


ROS Statement: 


Those systems with pertinent positive or pertinent negative responses have been 

documented in the HPI.





ROS Other: All systems not noted in ROS Statement are negative.


Constitutional: Denies: fever


Eyes: Denies: eye pain


ENT: Denies: ear pain


Respiratory: Reports: as per HPI.  Denies: cough


Cardiovascular: Reports: chest pain, dyspnea on exertion


Endocrine: Denies: fatigue


Gastrointestinal: Denies: abdominal pain


Neurological: Denies: weakness





Past Medical History


Past Medical History: GERD/Reflux


Additional Past Medical History / Comment(s): DIARRHEA, epigastric pain- right 

after eating


History of Any Multi-Drug Resistant Organisms: None Reported


Past Surgical History:  Section, Cholecystectomy


Additional Past Surgical History / Comment(s): COLONOSCOPY, EGD, C/S x 3


Past Anesthesia/Blood Transfusion Reactions: No Reported Reaction


Past Psychological History: Anxiety, Depression


Smoking Status: Never smoker


Past Alcohol Use History: Rare


Past Drug Use History: None Reported





- Past Family History


  ** Father


Family Medical History: Cancer





General Exam


Limitations: no limitations


General appearance: alert, in no apparent distress


Head exam: Present: normocephalic


Eye exam: Present: normal appearance


Neck exam: Present: normal inspection


Respiratory exam: Present: normal lung sounds bilaterally.  Absent: chest wall 

tenderness


Cardiovascular Exam: Present: regular rate, normal rhythm, normal heart sounds


  ** Expanded


Peripheral pulses: 2+: Radial (R), Radial (L), Posterior Tibialis (R), Posterior

 Tibialis (L)


GI/Abdominal exam: Present: soft.  Absent: tenderness


Extremities exam: Present: normal inspection.  Absent: pedal edema, calf te

nderness


Neurological exam: Present: alert


Psychiatric exam: Present: normal affect, normal mood


Skin exam: Present: normal color





Course


                                   Vital Signs











  24





  07:49 08:16 08:38


 


Temperature 98.2 F  


 


Pulse Rate 87 92 84


 


Respiratory 18 18 16





Rate   


 


Blood Pressure 143/75 110/77 101/87


 


O2 Sat by Pulse 97 96 97





Oximetry   














  24





  09:03


 


Temperature 


 


Pulse Rate 78


 


Respiratory 16





Rate 


 


Blood Pressure 96/81


 


O2 Sat by Pulse 96





Oximetry 














EKG Findings





- EKG Results:


EKG: interpreted by BRYAN, sinus rhythm, normal axis, normal QRS, normal ST/T





Medical Decision Making





- Medical Decision Making





Was pt. sent in by a medical professional or institution (, PA, NP, urgent 

care, hospital, or nursing home...) When possible be specific


@  -No


Did you speak to anyone other than the patient for history (EMS, parent, family,

 police, friend...)? What history was obtained from this source 


@  -No


Did you review nursing and triage notes (agree or disagree)?  Why? 


@  -I reviewed and agree with nursing and triage notes


Were old charts reviewed (outside hosp., previous admission, EMS record, old 

EKG, old radiological studies, urgent care reports/EKG's, nursing home records)?

 Report findings 


@  -No old charts were reviewed


Differential Diagnosis (chest pain, altered mental status, abdominal pain women,

 abdominal pain men, vaginal bleeding, weakness, fever, dyspnea, syncope, 

headache, dizziness, GI bleed, back pain, seizure, CVA, palpatations, mental 

health, musculoskeletal)? 


@  -Differential Chest Pain:


Stable Angina, Unstable Angina, STEMI, NSTEMI Aortic Dissection, Pneumothorax, 

Musculoskeletal, Esophageal Spasm GERD, Cholecystitis, Pancreatitis, Zoster, 

this is not meant to be an all-inclusive list. 





EKG interpreted by me (3pts min.).


@  -As above


X-rays interpreted by me (1pt min.).


@  -Chest x-ray shows no acute process


CT interpreted by me (1pt min.).


@  -None done


U/S interpreted by me (1pt. min.).


@  -None done


What testing was considered but not performed or refused? (CT, X-rays, U/S, 

labs)? Why?


@  -None


What meds were considered but not given or refused? Why?


@  -None


Did you discuss the management of the patient with other professionals 

(professionals i.e. Dr., PA, NP, lab, RT, psych nurse, , , 

teacher, , )? Give summary


@  -Case discussed with andres Montoya with [facility] who will 

admit covering Dr. Davila


Was smoking cessation discussed for >3mins.?


@  -No


Was critical care preformed (if so, how long)?


@  -No


Were there social determinants of health that impacted care today? How? 

(Homelessness, low income, unemployed, alcoholism, drug addiction, 

transportation, low edu. Level, literacy, decrease access to med. care, [facility], 

rehab)?


@  -No


Was there de-escalation of care discussed even if they declined (Discuss DNR or 

withdrawal of care, Hospice)? DNR status


@  -No


What co-morbidities impacted this encounter? (DM, HTN, Smoking, COPD, CAD, 

Cancer, CVA, ARF, Chemo, Hep., AIDS, mental health diagnosis, sleep apnea, 

morbid obesity)?


@  -None


Was patient admitted / discharged? Hospital course, mention meds given and 

route, prescriptions, significant lab abnormalities, going to OR and other 

pertinent info.


@  -Patient presents with chest discomfort with typical symptoms.  Patient will 

need admission with repeat cardiac enzyme testing and cardiology consult.  

Admission orders written.  Consult placed.  Patient reevaluated and updated.


Undiagnosed new problem with uncertain prognosis?


@  -No


Drug Therapy requiring intensive monitoring for toxicity (Heparin, Nitro, 

Insulin, Cardizem)?


@  -No


Were any procedures done?


@  -No


Diagnosis/symptom?


@  -Chest pain


Acute, or Chronic, or Acute on Chronic?


@  -Acute


Uncomplicated (without systemic symptoms) or Complicated (systemic symptoms)?


@  -Default


Side effects of treatment?


@  -No


Exacerbation, Progression, or Severe Exacerbation?


@  -No


Poses a threat to life or bodily function? How? (Chest pain, USA, MI, pneumonia,

 PE, COPD, DKA, ARF, appy, cholecystitis, CVA, Diverticulitis, Homicidal, 

Suicidal, threat to staff... and all critical care pts)


@  -Threat to cardiac function








- Lab Data


Result diagrams: 


                                 24 08:12





                                 24 08:12


                                   Lab Results











  24 Range/Units





  08:12 08:12 08:12 


 


WBC   5.2   (3.8-10.6)  k/uL


 


RBC   4.70   (3.80-5.40)  m/uL


 


Hgb   14.0   (11.4-16.0)  gm/dL


 


Hct   42.5   (34.0-46.0)  %


 


MCV   90.6   (80.0-100.0)  fL


 


MCH   29.9   (25.0-35.0)  pg


 


MCHC   33.0   (31.0-37.0)  g/dL


 


RDW   12.9   (11.5-15.5)  %


 


Plt Count   250   (150-450)  k/uL


 


MPV   8.0   


 


Neutrophils %   68   %


 


Lymphocytes %   21   %


 


Monocytes %   6   %


 


Eosinophils %   3   %


 


Basophils %   1   %


 


Neutrophils #   3.5   (1.3-7.7)  k/uL


 


Lymphocytes #   1.1   (1.0-4.8)  k/uL


 


Monocytes #   0.3   (0-1.0)  k/uL


 


Eosinophils #   0.1   (0-0.7)  k/uL


 


Basophils #   0.0   (0-0.2)  k/uL


 


PT  10.4    (10.0-12.5)  sec


 


INR  0.9    (<1.2)  


 


APTT  24.9    (22.0-30.0)  sec


 


D-Dimer  0.38    (<0.60)  mg/L FEU


 


Sodium    138  (137-145)  mmol/L


 


Potassium    4.4  (3.5-5.1)  mmol/L


 


Chloride    105  ()  mmol/L


 


Carbon Dioxide    24  (22-30)  mmol/L


 


Anion Gap    9  mmol/L


 


BUN    7  (7-17)  mg/dL


 


Creatinine    0.68  (0.52-1.04)  mg/dL


 


Est GFR (CKD-EPI)AfAm    >90  (>60 ml/min/1.73 sqM)  


 


Est GFR (CKD-EPI)NonAf    >90  (>60 ml/min/1.73 sqM)  


 


Glucose    98  (74-99)  mg/dL


 


Calcium    9.0  (8.4-10.2)  mg/dL


 


Magnesium    1.8  (1.6-2.3)  mg/dL


 


Total Bilirubin    1.7 H  (0.2-1.3)  mg/dL


 


AST    29  (14-36)  U/L


 


ALT    15  (4-34)  U/L


 


Alkaline Phosphatase    58  ()  U/L


 


Troponin I     (0.000-0.034)  ng/mL


 


Total Protein    7.5  (6.3-8.2)  g/dL


 


Albumin    4.5  (3.5-5.0)  g/dL


 


Amylase    62  ()  U/L


 


Lipase    88  ()  U/L














  24 Range/Units





  08:12 


 


WBC   (3.8-10.6)  k/uL


 


RBC   (3.80-5.40)  m/uL


 


Hgb   (11.4-16.0)  gm/dL


 


Hct   (34.0-46.0)  %


 


MCV   (80.0-100.0)  fL


 


MCH   (25.0-35.0)  pg


 


MCHC   (31.0-37.0)  g/dL


 


RDW   (11.5-15.5)  %


 


Plt Count   (150-450)  k/uL


 


MPV   


 


Neutrophils %   %


 


Lymphocytes %   %


 


Monocytes %   %


 


Eosinophils %   %


 


Basophils %   %


 


Neutrophils #   (1.3-7.7)  k/uL


 


Lymphocytes #   (1.0-4.8)  k/uL


 


Monocytes #   (0-1.0)  k/uL


 


Eosinophils #   (0-0.7)  k/uL


 


Basophils #   (0-0.2)  k/uL


 


PT   (10.0-12.5)  sec


 


INR   (<1.2)  


 


APTT   (22.0-30.0)  sec


 


D-Dimer   (<0.60)  mg/L FEU


 


Sodium   (137-145)  mmol/L


 


Potassium   (3.5-5.1)  mmol/L


 


Chloride   ()  mmol/L


 


Carbon Dioxide   (22-30)  mmol/L


 


Anion Gap   mmol/L


 


BUN   (7-17)  mg/dL


 


Creatinine   (0.52-1.04)  mg/dL


 


Est GFR (CKD-EPI)AfAm   (>60 ml/min/1.73 sqM)  


 


Est GFR (CKD-EPI)NonAf   (>60 ml/min/1.73 sqM)  


 


Glucose   (74-99)  mg/dL


 


Calcium   (8.4-10.2)  mg/dL


 


Magnesium   (1.6-2.3)  mg/dL


 


Total Bilirubin   (0.2-1.3)  mg/dL


 


AST   (14-36)  U/L


 


ALT   (4-34)  U/L


 


Alkaline Phosphatase   ()  U/L


 


Troponin I  <0.012  (0.000-0.034)  ng/mL


 


Total Protein   (6.3-8.2)  g/dL


 


Albumin   (3.5-5.0)  g/dL


 


Amylase   ()  U/L


 


Lipase   ()  U/L














Disposition


Clinical Impression: 


 Chest pain





Disposition: ADMITTED AS IP TO THIS HOSP


Is patient prescribed a controlled substance at d/c from ED?: No


Referrals: 


Skip Jauregui MD [Primary Care Provider] - 1-2 days


Time of Disposition: 09:54

## 2024-12-07 NOTE — XR
2 view chest

 

HISTORY: Chest pain.

 

COMPARISON: None

 

TECHNIQUE: PA and lateral views chest obtained.

 

FINDINGS:

 

The lungs are clear of consolidative, interstitial or masslike opacity.

 

There is no pleural effusion, pleural thickening or pneumothorax.

 

The heart, pulmonary vasculature, mediastinum and cory are within normal limits.

 

The osseous structures and soft tissues of the thorax are intact.

 

IMPRESSION:

 

No significant abnormality. No acute cardiopulmonary disease.

 

[facility], Workstation: [email or web address], 12/7/2024 8:29 AM

## 2024-12-08 VITALS — RESPIRATION RATE: 17 BRPM

## 2024-12-08 LAB
ALBUMIN SERPL-MCNC: 4.4 G/DL (ref 3.5–5)
ALBUMIN/GLOB SERPL: 1.5 {RATIO}
ALP SERPL-CCNC: 71 U/L (ref 38–126)
ALT SERPL-CCNC: 16 U/L (ref 4–34)
ANION GAP SERPL CALC-SCNC: 6 MMOL/L
AST SERPL-CCNC: 19 U/L (ref 14–36)
BILIRUB INDIRECT SERPL-MCNC: 1.2 MG/DL (ref 0–1.1)
BUN SERPL-SCNC: 9 MG/DL (ref 7–17)
CALCIUM SPEC-MCNC: 9.3 MG/DL (ref 8.4–10.2)
CHLORIDE SERPL-SCNC: 104 MMOL/L (ref 98–107)
CHOLEST SERPL-MCNC: 146 MG/DL (ref 0–200)
CO2 SERPL-SCNC: 27 MMOL/L (ref 22–30)
ERYTHROCYTE [DISTWIDTH] IN BLOOD BY AUTOMATED COUNT: 4.67 M/UL (ref 3.8–5.4)
ERYTHROCYTE [DISTWIDTH] IN BLOOD: 13 % (ref 11.5–15.5)
GLOBULIN SER CALC-MCNC: 2.9 G/DL
GLUCOSE SERPL-MCNC: 90 MG/DL (ref 74–99)
HCT VFR BLD AUTO: 42.7 % (ref 34–46)
HDLC SERPL-MCNC: 62.6 MG/DL (ref 40–60)
HGB BLD-MCNC: 14.2 GM/DL (ref 11.4–16)
LDLC SERPL CALC-MCNC: 72.6 MG/DL (ref 0–131)
MAGNESIUM SPEC-SCNC: 1.9 MG/DL (ref 1.6–2.3)
MCH RBC QN AUTO: 30.4 PG (ref 25–35)
MCHC RBC AUTO-ENTMCNC: 33.1 G/DL (ref 31–37)
MCV RBC AUTO: 91.6 FL (ref 80–100)
PLATELET # BLD AUTO: 238 K/UL (ref 150–450)
POTASSIUM SERPL-SCNC: 4.1 MMOL/L (ref 3.5–5.1)
PROT SERPL-MCNC: 7.3 G/DL (ref 6.3–8.2)
SODIUM SERPL-SCNC: 137 MMOL/L (ref 137–145)
TRIGL SERPL-MCNC: 54 MG/DL (ref 0–149)
VLDLC SERPL CALC-MCNC: 10.8 MG/DL (ref 5–40)
WBC # BLD AUTO: 5.4 K/UL (ref 3.8–10.6)

## 2024-12-08 RX ADMIN — BUPROPION HYDROCHLORIDE SCH MG: 150 TABLET, FILM COATED, EXTENDED RELEASE ORAL at 08:41

## 2024-12-08 RX ADMIN — PANTOPRAZOLE SODIUM SCH MG: 40 TABLET, DELAYED RELEASE ORAL at 08:41

## 2024-12-08 RX ADMIN — ASPIRIN 81 MG CHEWABLE TABLET SCH MG: 81 TABLET CHEWABLE at 08:39

## 2024-12-08 RX ADMIN — SUCRALFATE SCH GM: 1 TABLET ORAL at 12:18

## 2024-12-08 NOTE — P.CRDCN
History of Present Illness


History of present illness: 





HISTORY OF PRESENT ILLNESS:  





This is a 49-year-old female with a past medical history significant for 

anxiety, depression, and GERD. Patient does not follow with a cardiologist. We 

have been asked to see the patient in consultation for chest pain. Patient 

examined at the bedside by Dr. Salgado.  Patient reports she has been having chest

pain on and off for the past week.  She states the pain sometimes goes in 

between her shoulder blades as well.  She states the pain is not necessarily 

exertionally related.  Denies any nausea or vomiting.  She also reports having 

occasional palpitations.  Patient denies any known history of CAD.  She is a 

non-smoker.





DIAGNOSTICS:


- EKG reveals sinus mechanism with no signs of acute ischemia


- Chest xray negative for acute process


- Laboratory data: WBC 5.4.  Hemoglobin 14.2.  Platelet count 238.  D-dimer 

0.38.  Sodium 137.  Potassium 4.1.  BUN 9.  Creatinine 0.74.  Troponin negative 

x 3.  TSH 0.474.


- Current home cardiac medications include none


- Most recent echocardiogram obtained this admission reveals ejection fraction 

55 to 60%, trace MR, mild TR


- Cardiac catheterization history: Patient denies





REVIEW OF SYSTEMS: 


At the time of my exam:


CONSTITUTIONAL: Denies fever or chills.


HEENT: Denies blurred vision, vision changes, or eye pain. Denies hemoptysis 


CARDIOVASCULAR: Denies chest pain. Denies orthopnea. Denies PND. Denies pa

lpitations


RESPIRATORY: Denies shortness of breath. 


GASTROINTESTINAL: Denies abdominal pain. Denies nausea or vomiting. 


HEMATOLOGIC: Denies bleeding disorders.


GENITOURINARY:  Denies any blood in urine.


SKIN: Denies pruitis. Denies rash.





PHYSICAL EXAM: 


VITAL SIGNS: Reviewed.


GENERAL: Well-developed in no acute distress. 


HEENT: Head is normocephalic. Pupils are equal, round. Sclerae anicteric. Mucous

membranes of the mouth are moist. Neck supple. No JVD or thyromegaly


LUNGS: Respirations even and unlabored. Lungs essentially clear to auscultation 

bilaterally.


HEART: Regular rate and rhythm.  S1 and S2 heard.


ABDOMEN: Soft. Nondistended. Nontender.


EXTREMITIES: Normal range of motion.  No clubbing or cyanosis.  Peripheral 

pulses intact.  No lower extremity edema


NEUROLOGIC: Awake and alert. Oriented x 3. 





ASSESSMENT: 


Chest pain, troponin negative x 3


Occasional palpitations


History of cholecystectomy


Anxiety


Depression


GERD





PLAN: 


An acute coronary event has been ruled out


2D echo obtained and reviewed


Patient started on aspirin and atorvastatin per primary medicine


Patient to undergo stress echocardiogram tomorrow


If negative, she may be discharged home from a cardiac standpoint


Further recommendations pending patient course








Nurse practitioner note has been reviewed by physician. Signing provider agrees 

with the documented findings, assessment, and plan of care documented by NP as a

scribe.








Past Medical History


Past Medical History: GERD/Reflux


Additional Past Medical History / Comment(s): DIARRHEA, epigastric pain- right 

after eating


History of Any Multi-Drug Resistant Organisms: None Reported


Past Surgical History:  Section, Cholecystectomy


Additional Past Surgical History / Comment(s): COLONOSCOPY, EGD, C/S x 3


Past Anesthesia/Blood Transfusion Reactions: No Reported Reaction


Past Psychological History: Anxiety, Depression


Smoking Status: Never smoker


Past Alcohol Use History: Rare


Past Drug Use History: None Reported





- Past Family History


  ** Father


Family Medical History: Cancer





Medications and Allergies


                                Home Medications











 Medication  Instructions  Recorded  Confirmed  Type


 


Omeprazole [PriLOSEC] 40 mg PO DAILY 24 History


 


buPROPion XL [Wellbutrin XL] 150 mg PO DAILY 24 History








                                    Allergies











Allergy/AdvReac Type Severity Reaction Status Date / Time


 


No Known Allergies Allergy   Verified 24 10:21














Physical Exam


Vitals: 


                                   Vital Signs











  Temp Pulse Pulse Resp BP BP Pulse Ox


 


 24 01:09  98.0 F   74  17   108/69  98


 


 24 20:00  98.5 F   74  17   95/59  97


 


 24 13:05  98.8 F   74  16   96/65  99


 


 24 10:21  98.4 F  94   16  106/84   96


 


 24 09:54        99


 


 24 09:03   78   16  96/81   96


 


 24 08:38   84   16  101/87   97


 


 24 08:16   92   18  110/77   96


 


 24 07:49  98.2 F  87   18  143/75   97








                                Intake and Output











 24





 22:59 06:59 14:59


 


Other:   


 


  # Voids 2 2 














Results





                                 24 07:05





                                 24 07:05


                                 Cardiac Enzymes











  24 Range/Units





  08:12 08:12 11:10 


 


AST  29    (14-36)  U/L


 


Troponin I   <0.012  <0.012  (0.000-0.034)  ng/mL














  24 Range/Units





  15:47 


 


AST   (14-36)  U/L


 


Troponin I  <0.012  (0.000-0.034)  ng/mL








                                   Coagulation











  24 Range/Units





  08:12 


 


PT  10.4  (10.0-12.5)  sec


 


APTT  24.9  (22.0-30.0)  sec








                                       CBC











  24 Range/Units





  08:12 


 


WBC  5.2  (3.8-10.6)  k/uL


 


RBC  4.70  (3.80-5.40)  m/uL


 


Hgb  14.0  (11.4-16.0)  gm/dL


 


Hct  42.5  (34.0-46.0)  %


 


Plt Count  250  (150-450)  k/uL








                          Comprehensive Metabolic Panel











  24 Range/Units





  08:12 


 


Sodium  138  (137-145)  mmol/L


 


Potassium  4.4  (3.5-5.1)  mmol/L


 


Chloride  105  ()  mmol/L


 


Carbon Dioxide  24  (22-30)  mmol/L


 


BUN  7  (7-17)  mg/dL


 


Creatinine  0.68  (0.52-1.04)  mg/dL


 


Glucose  98  (74-99)  mg/dL


 


Calcium  9.0  (8.4-10.2)  mg/dL


 


AST  29  (14-36)  U/L


 


ALT  15  (4-34)  U/L


 


Alkaline Phosphatase  58  ()  U/L


 


Total Protein  7.5  (6.3-8.2)  g/dL


 


Albumin  4.5  (3.5-5.0)  g/dL








                               Current Medications











Generic Name Dose Route Start Last Admin





  Trade Name Freq  PRN Reason Stop Dose Admin


 


Aspirin  81 mg  24 09:00 





  Aspirin 81 Mg  PO  





  DAILY [facility]  


 


Atorvastatin Calcium  40 mg  24 21:00  24 20:14





  Atorvastatin 40 Mg Tab  PO   40 mg





  [facility]VES   Administration


 


Bupropion HCl  150 mg  24 09:00 





  Bupropion Xl 150 Mg Tab.Er.24h  PO  





  DAILY YVES  


 


Heparin Sodium (Porcine)  5,000 unit  24 16:00  24 00:17





  Heparin Sodium,Porcine 5,000 Unit/Ml 1 Ml Vial  SQ   Not Given





  Q8HR YVES  


 


Nitroglycerin  0.4 mg  24 09:54 





  Nitroglycerin Sl Tabs 0.4 Mg Tab  SUBLINGUAL  





  Q5M PRN  





  Chest Pain  


 


Nitroglycerin  1 inch  24 12:00  24 05:37





  Nitroglycerin Oint 1 Inch/Gm Packet  TOPICAL   Not Given





  Q6HR YVES  


 


Pantoprazole Sodium  40 mg  24 09:00 





  Pantoprazole 40 Mg Tablet  PO  





  DAILY YVES  








                                Intake and Output











 24





 22:59 06:59 14:59


 


Other:   


 


  # Voids 2 2 








                                        





                                 24 08:12 





                                 24 08:12

## 2024-12-08 NOTE — CA
Transthoracic Echo Report 

 Name: Dorcas Bello 

 MRN:    [id number] 

 Age:    49     Gender:     F 

 

 :    1975 

 Exam Date:     2024 14:16 

 Exam Location: [facility] 

 Ht (in):     61     Wt (lb):     175 

 Ordering Physician:        Jan Meléndez 

 Attending/Referring Phys: 

 Technician         Mara Garcia RDCS 

 Procedure CPT: 

 Indications:       CP, palpitations 

 

 Cardiac Hx: 

 Technical Quality:      Good 

 Contrast 1:                                Total Dose (mL): 

 Contrast 2:                                Total Dose (mL): 

 

 MEASUREMENTS  (Male / Female) Normal Values 

 2D ECHO 

 LV Diastolic Diameter PLAX        4.1 cm                4.2 - 5.9 / 3.9 - 5.3 cm 

 LV Systolic Diameter PLAX         2.9 cm                 

 IVS Diastolic Thickness           1.1 cm                0.6 - 1.0 / 0.6 - 0.9 cm 

 LVPW Diastolic Thickness          1.0 cm                0.6 - 1.0 / 0.6 - 0.9 cm 

 LV Relative Wall Thickness        0.5                    

 RV Internal Dim ED PLAX           3.0 cm                 

 LA Systolic Diameter LX           2.5 cm                3.0 - 4.0 / 2.7 - 3.8 cm 

 LA Volume                         35.7 cm???              18 - 58 / 22 - 52 cm??? 

 LA Volume Index                   19.0 cm???/m???           16 - 28 cm???/m??? 

 

 M-MODE 

 Aortic Root Diameter MM           2.7 cm                 

 AV Cusp Separation MM             2.0 cm                 

 

 DOPPLER 

 AV Peak Velocity                  135.6 cm/s             

 AV Peak Gradient                  7.4 mmHg               

 MV Area PHT                       4.0 cm???                

 Mitral E Point Velocity           102.3 cm/s             

 Mitral A Point Velocity           64.5 cm/s              

 Mitral E to A Ratio               1.6                    

 MV Deceleration Time              187.8 ms               

 TR Peak Velocity                  221.1 cm/s             

 TR Peak Gradient                  19.6 mmHg              

 Right Ventricular Systolic Press  24.6 mmHg              

 

 

 FINDINGS 

 Left Ventricle 

 Left ventricular ejection fraction is estimated at 55-60 %.  Mildly increased  

 septal wall thickness. Mildly increased posterior wall thickness. Normal left  

 ventricular wall motion. 

 

 Right Ventricle 

 Normal right ventricular size and function. Right ventricular systolic pressure  

 within normal limits. 

 

 Right Atrium 

 Normal right atrial size. No right atrial thrombus or mass seen. 

 

 Left Atrium 

 Normal left atrial size. No left atrial thrombus or mass present. 

 

 Mitral Valve 

 Structurally normal mitral valve. Trace mitral regurgitation. 

 

 Aortic Valve 

 Trileaflet aortic valve. No aortic valve stenosis or regurgitation. 

 

 Tricuspid Valve 

 Structurally normal tricuspid valve. Mild tricuspid regurgitation. 

 

 Pulmonic Valve 

 Structurally normal pulmonic valve. No pulmonic regurgitation. 

 

 Pericardium 

 No pericardial or pleural effusion. 

 

 Aorta 

 Normal size aortic root and proximal ascending aorta. 

 

 CONCLUSIONS 

 Normal LV function 

 Previewed by:  

 Dr. Fred Salgado MD 

 (Electronically Signed) 

 Final Date:      2024 09:08

## 2024-12-08 NOTE — US
EXAMINATION TYPE: US liver

 

DATE OF EXAM: 12/8/2024

 

COMPARISON: NONE

 

CLINICAL INDICATION: Female, 49 years old with history of eval CBD, pt reports hx of enlargement s/p 
anthan; GB removed.  RUQ pain. 

 

TECHNIQUE: Grayscale and color Doppler imaging of the right upper quadrant was performed.

 

FINDINGS:

 

EXAM MEASUREMENTS:

 

Liver Length:  16.6 cm   

CBD:  0.6 cm

Right Kidney:  11.4 x 4.8 x 4.4 cm

 

 

 

Pancreas:  wnl

Liver:  wnl  

Gallbladder:  Surgically absent

**Evidence for sonographic Baires's sign:  neg

CBD:  wnl 

Right Kidney:  No hydronephrosis or masses seen 

 

 

IMPRESSION: 

Surgical absence of the gallbladder. No other significant abnormality seen.

 

[facility], Workstation: [id number], 12/8/2024 7:27 AM

## 2024-12-08 NOTE — P.PN
Subjective


Progress Note Date: 12/08/24





Hospital Course:





Patient is a very pleasant 49-year-old female with a past medical history of 

GERD and anxiety.  She presented to the emergency department with a chief 

complaint of chest pain and palpitations.  Patient reports over the past month 

she has been experiencing intermittent chest pain and palpitations.  Patient 

reports pain is to her midsternal chest radiating into her back and between her 

shoulder blades.  Patient reports occasionally she will have pain radiate into 

her left arm accompanied by tingling.  Patient reports longstanding history of p

alpitations since the birth of her son but states she was worked up for this 

years ago and even wore an event monitor for 7 days but they did not find 

anything.  Patient states over the past month this has worsened and has woken 

her from a sound sleep, occurred at rest, and with exertion.  She denies any 

known cardiac history or family history of sudden cardiac death before the age 

of 60.  Patient denies having any headache, lightheadedness, dizziness, 

shortness of breath, cough or congestion, or experiencing any swelling in her 

lower extremities.  Upon arrival to our facility, patient underwent evaluation 

in the emergency department.  Vital signs upon arrival show blood pressure 

143/75, heart rate 87, respiratory rate 18, temp 98.2 F, and SpO2 of 97% on 

room air.  EKG completed showing normal sinus rhythm at 85 bpm with no 

significant T wave or ST abnormality showing no signs of acute ischemia upon 

personal review and interpretation.  Chest x-ray negative for acute 

cardiopulmonary process.  Labs completed and reviewed.  CBC unremarkable.  

Coagulation profile normal findings.  D-dimer 0.38.  BMP unremarkable.  Blood 

glucose 98.  Magnesium normal findings at 1.8.  Liver profile showing 

hyperbilirubinemia with total bili of 1.7 otherwise normal findings.  Troponin 

was negative at less than 0.012.  Amylase and lipase normal findings.  Patient 

admitted under services with consultation to cardiology.  CT thoracic spine 

showing mild multilevel degenerative changes in the mid and lower thoracic spine

otherwise normal findings.  Echocardiogram completed showing a preserved EF of 5

5 to 60% with no significant valvular or structural abnormalities reported.  

Liver ultrasound completed negative for significant abnormalities reporting 

liver and common bile duct within normal limits and no reported dilation of CBD.

 Troponins were trended negative at less than 0.02 x 3 draws.  TSH 0.474.  

Patient evaluated by cardiology and they are planning to take patient for 

cardiac stress test tomorrow morning.





Physical Exam:





Patient was seen and fully evaluated at bedside this morning.  She currently 

reports feeling well and denies having any current chest pain or palpitations 

now or throughout the night.  She reports feeling nauseous with a lot of acid 

reflux after each meal and orders placed for Carafate 1 g with meals and 

nightly.  Patient visiting with  at bedside.  She currently denies having

any other questions, needs, complaints, or concerns.





Vital signs reviewed and stable. 


General: Nontoxic, no distress and appears stated age.  


Derm: Skin warm and dry, normal coloration for ethnicity.


Head: Atraumatic, normocephalic and symmetric.  


Eyes: EOM's intact, no lid lag, and anicteric sclera


Mouth: no lip lesions, mucus membranes moist


Cardiovascular: regular rate and rhythm with normal S1S2, no murmur, positive 

posterior tibial pulses bilaterally, and cap refill < 2 seconds.  


Lungs: Respirations even, regular, and unlabored on room air. Lungs CTA 

bilaterally, no rhonchi, no rales, no wheezing, and no accessory muscle usage. 


Abdominal: soft, nontender to palpation, no guarding, no appreciable 

organomegaly


Ext: ROM intact. No gross muscle atrophy, no edema, no contractures


Neuro: Speech clear, face symmetrical and CN II-XII grossly intact with no noted

focal neuro deficits


Psych: Alert and oriented to person, place, time, and situation. Appropriate and

pleasant affect. 





Assessment and Plan of Care:





Chest pain and palpitations, rule out acute coronary event 


Back pain, between shoulder blades


Hyperbilirubinemia


GERD


Anxiety


-Cardiology consulted, appreciate recommendations


-Order placed for CT thoracic spine


-Telemetry monitoring


-Trend troponins


-Aspirin 81 mg daily and atorvastatin 40 mg nightly.


-Lipid profile with a.m. labs. 


-Echocardiogram


-Order placed for ultrasound of liver/common bile duct for evaluation as patient

does report previous incident of dilated common bile duct after laparoscopic 

cholecystectomy.


-Order placed for TSH secondary to reports of palpitations, we will also repeat 

morning CBC, CMP, and magnesium levels along with fractionated bilirubin for 

further evaluation.


-Patient to continue daily medication regimen with Wellbutrin 150 mg daily and 

Protonix 40 mg daily.





Data and imaging reviewed:





-CT thoracic spine showing mild multilevel degenerative changes in the mid and 

lower thoracic spine otherwise normal findings.  


-Echocardiogram completed showing a preserved EF of 55 to 60% with no 

significant valvular or structural abnormalities reported.  


-Liver ultrasound completed negative for significant abnormalities reporting 

liver and common bile duct within normal limits and no reported dilation of CBD.

 


-Labs reviewed.  Troponins were trended negative at less than 0.02 x 3 draws.  

TSH 0.474.  CBC and BMP unremarkable.  Liver profile unremarkable with exception

of slightly elevated conjugated bilirubin of 1.2 however total bili decreased 

and within normal range at 1.2.  Lipid profile unremarkable.








CODE STATUS:  Full Code


DVT prophylaxis: Heparin


Anticipated discharge date: Pending clinical course, patient scheduled to 

undergo cardiac stress test tomorrow morning.


Anticipated discharge place: Home


Patient was seen independently by Nurse Practitioner.


This document was prepared using Dragon dictation software.  Please allow for 

errors in transcription while rare they do occur.





Jan Meléndez NP rendered care for this patient independently, reviewed the find

ings and plan as documented in the note above and agree with plan.  I did not 

physically speak with or examine the patient on this date.








Objective





- Vital Signs


Vital signs: 


                                   Vital Signs











Temp  98.0 F   12/08/24 07:00


 


Pulse  60   12/08/24 07:00


 


Resp  16   12/08/24 07:00


 


BP  104/66   12/08/24 07:00


 


Pulse Ox  98   12/08/24 07:00


 


FiO2      








                                 Intake & Output











 12/07/24 12/08/24 12/08/24





 18:59 06:59 18:59


 


Intake Total 118  


 


Balance 118  


 


Weight 79.379 kg  


 


Intake:   


 


  Oral 118  


 


Other:   


 


  # Voids 2 2 














- Labs


CBC & Chem 7: 


                                 12/08/24 07:05





                                 12/08/24 07:05


Labs: 


                  Abnormal Lab Results - Last 24 Hours (Table)











  12/08/24 Range/Units





  07:05 


 


Unconjugated Bilirubin  1.2 H  (0.0-1.1)  mg/dL

## 2024-12-09 VITALS — DIASTOLIC BLOOD PRESSURE: 87 MMHG | SYSTOLIC BLOOD PRESSURE: 120 MMHG | HEART RATE: 74 BPM | TEMPERATURE: 98.3 F

## 2024-12-09 LAB
ERYTHROCYTE [DISTWIDTH] IN BLOOD BY AUTOMATED COUNT: 4.64 X 10*6/UL (ref 4.1–5.2)
ERYTHROCYTE [DISTWIDTH] IN BLOOD: 13.2 % (ref 11.5–14.5)
HCT VFR BLD AUTO: 41.1 % (ref 37.2–46.3)
HGB BLD-MCNC: 13.9 G/DL (ref 12–15)
MCH RBC QN AUTO: 30 PG (ref 27–32)
MCHC RBC AUTO-ENTMCNC: 33.8 G/DL (ref 32–37)
MCV RBC AUTO: 88.6 FL (ref 80–97)
NRBC BLD AUTO-RTO: 0 X 10*3/UL (ref 0–0.01)
PLATELET # BLD AUTO: 229 X 10*3/UL (ref 140–440)
WBC # BLD AUTO: 6.78 X 10*3/UL (ref 4.5–10)

## 2024-12-09 NOTE — P.DS
Providers


Date of admission: 


12/07/24 09:55





Expected date of discharge: 12/09/24


Attending physician: 


Candi Turcios MD





Consults: 





                                        





12/07/24 09:54


Consult Physician Urgent 


   Consulting Provider: Fred Salgado


   Consult Reason/Comments: cp


   Do you want consulting provider notified?: Yes











Primary care physician: 


Skip Jauregui MD





Hospital Course: 





Discharge Diagnosis:





Chest pain and palpitations, acute coronary event ruled out.  Troponins were 

negative at less than 0.012 x 3 draws.  EKG showing sinus mechanism.  Echoc

ardiogram revealing a preserved EF of 55 to 60% and cardiac stress test was 

negative.





Back pain, between shoulder blades. CT thoracic spine showing mild multilevel 

degenerative changes in the mid and lower thoracic spine otherwise normal 

findings.





Hyperbilirubinemia.  Resolved.





GERD.  Continue with Protonix 40 mg daily and was started on Carafate 1 g with 

meals and at bedtime.





Anxiety. Patient to continue daily medication regimen with Wellbutrin 150 mg 

daily





Hospital Course:





Patient is a very pleasant 49-year-old female with a past medical history of 

GERD and anxiety.  She presented to the emergency department with a chief 

complaint of chest pain and palpitations.  Patient reports over the past month 

she has been experiencing intermittent chest pain and palpitations.  Patient r

eports pain is to her midsternal chest radiating into her back and between her 

shoulder blades.  Patient reports occasionally she will have pain radiate into 

her left arm accompanied by tingling.  Patient reports longstanding history of 

palpitations since the birth of her son but states she was worked up for this 

years ago and even wore an event monitor for 7 days but they did not find 

anything.  Patient states over the past month this has worsened and has woken 

her from a sound sleep, occurred at rest, and with exertion.  She denies any 

known cardiac history or family history of sudden cardiac death before the age 

of 60.  Patient denies having any headache, lightheadedness, dizziness, 

shortness of breath, cough or congestion, or experiencing any swelling in her 

lower extremities.  Upon arrival to our facility, patient underwent evaluation 

in the emergency department.  Vital signs upon arrival show blood pressure 

143/75, heart rate 87, respiratory rate 18, temp 98.2 F, and SpO2 of 97% on 

room air.  EKG completed showing normal sinus rhythm at 85 bpm with no 

significant T wave or ST abnormality showing no signs of acute ischemia upon 

personal review and interpretation.  Chest x-ray negative for acute 

cardiopulmonary process.  Labs completed and reviewed.  CBC unremarkable.  

Coagulation profile normal findings.  D-dimer 0.38.  BMP unremarkable.  Blood 

glucose 98.  Magnesium normal findings at 1.8.  Liver profile showing 

hyperbilirubinemia with total bili of 1.7 otherwise normal findings.  Troponin 

was negative at less than 0.012.  Amylase and lipase normal findings.  Patient 

admitted under services with consultation to cardiology.  CT thoracic spine 

showing mild multilevel degenerative changes in the mid and lower thoracic spine

otherwise normal findings.  Echocardiogram completed showing a preserved EF of 

55 to 60% with no significant valvular or structural abnormalities reported.  

Liver ultrasound completed negative for significant abnormalities reporting 

liver and common bile duct within normal limits and no reported dilation of CBD.

 Troponins were trended negative at less than 0.02 x 3 draws.  TSH 0.474.  

Patient evaluated by cardiology and they are planning to take patient for 

cardiac stress test tomorrow morning.  Stress test was negative.  Patient 

cleared from cardiac perspective for discharge and to follow-up outpatient with 

PCP in 1 to 2 days and with cardiologist in 1 week.





Physical Exam:





Vital signs reviewed and stable. 


General: Nontoxic, no distress and appears stated age.  


Derm: Skin warm and dry, normal coloration for ethnicity.


Head: Atraumatic, normocephalic and symmetric.  


Eyes: EOM's intact, no lid lag, and anicteric sclera


Mouth: no lip lesions, mucus membranes moist


Cardiovascular: regular rate and rhythm with normal S1S2, no murmur, positive 

posterior tibial pulses bilaterally, and cap refill < 2 seconds.  


Lungs: Respirations even, regular, and unlabored on room air. Lungs CTA b

ilaterally, no rhonchi, no rales, no wheezing, and no accessory muscle usage. 


Abdominal: soft, nontender to palpation, no guarding, no appreciable 

organomegaly


Ext: ROM intact. No gross muscle atrophy, no edema, no contractures


Neuro: Speech clear, face symmetrical and CN II-XII grossly intact with no noted

focal neuro deficits


Psych: Alert and oriented to person, place, time, and situation. Appropriate and

pleasant affect. 








A total of 35 minutes of time were spent preparing this complex discharge 

summary.


Pt was discharged on 12/9/2024 at 12:09 PM.





Patient was seen independently by Nurse Practitioner.


This document was prepared using Dragon dictation software.  Please allow for 

errors in transcription while rare they do occur.





Jan Meléndez NP rendered care for this patient independently, reviewed the 

findings and plan as documented in the note above.  I did not physically speak 

with or examine the patient on this date.





Patient Condition at Discharge: Stable





Plan - Discharge Summary


Discharge Rx Participant: No


New Discharge Prescriptions: 


New


   Sucralfate [Carafate] 1 gm PO ACHS 30 Days #120 tab





Continue


   buPROPion XL [Wellbutrin XL] 150 mg PO DAILY


   Omeprazole [PriLOSEC] 40 mg PO DAILY


Discharge Medication List





Omeprazole [PriLOSEC] 40 mg PO DAILY 12/07/24 [History]


buPROPion XL [Wellbutrin XL] 150 mg PO DAILY 12/07/24 [History]


Sucralfate [Carafate] 1 gm PO ACHS 30 Days #120 tab 12/09/24 [Rx]








Follow up Appointment(s)/Referral(s): 


Manuel Huerta MD [STAFF PHYSICIAN] - 1 Week


Skip Jauregui MD [Primary Care Provider] - 1-2 days


Patient Instructions/Handouts:  Chest Pain (DC)


Activity/Diet/Wound Care/Special Instructions: 


.





Activity:





As tolerated.  Take breaks as needed.





Diet: 





Heart healthy and carb consistent diet. Avoid salts, or foods with hidden salts 

such as canned or boxed foods and frozen dinners. Extra salt makes your heart 

work harder and traps the fluid in your body for longer.





Special Instructions:  





Take all of your medications as directed and remember to keep all of your 

doctor's appointments and follow-up as needed.  





Wishing you and your family a truly wonderful and blessed holiday season and a 

happy healthy new year!!!





Thank you for allowing us to participate in your care, it was truly a pleasure 

having you for our patient!!!





. 


Discharge Disposition: HOME SELF-CARE

## 2024-12-09 NOTE — CA
Stress Echo 

 Report 

 

 Dorcas Bello 

 

 Age:    49     Gender:    F 

 

 :    1975 

 

 Exam Date:     2024 09:43 

 

 Exam Location: [facility] 

 

 Ht (in):     61     Wt (lb):    175 

 

 Ordering Physician:        Ronda Poole 

 

 Referring Physician:       [id number] 

 

 Technician:                SAMANTHA 

 Technologist 

 MRN:    [id number] 

 

 Procedure CPT: 

 

 Indication:        CP 

 

 ICD-9 Codes: 

 

 Rhythm: 

 

 Patient History:                      Chest pain and palpitations. 

 

 Cardiac Medications: 

 

 Medications in past 24 hours: 

 

 Contrast: 

 

 Stress Results 

 

 Protocol:     Reuben 

 

 Total dose(mL): 

 

 Exercise Duration (min:sec):          9:00 

 Max ST Depression (mm): 

 Angina Score: 

 Anderson Score: 

 METS:    10.5 

 

 Resting HR:      75        Resting BP:     111    /   70 

 

 Peak HR:     187       Peak BP:     132   /   67 

 

 Max Predicted HR:       172 

 

 109    % Max Predicted HR 

 

 Target HR:     146 

 

 Double Product:       37069 

 

 Stress Summary: 

 

 BP Response: 

 

 Reason for Termination:        MAX EXERTION/TARGET HR 

 

 Cardiac Symptoms:              NO SYMPTOMS 

 

 ECG Analysis 

 

 Resting ECG: 

 

 Stress ECG: 

 

 Arrhythmia: 

 Echo Analysis 

 

 Resting Echo: 

 

 Peak Echo Analysis: 

 

 MEASUREMENTS (Male/Female) Normal Values 

 

 

 

 

 CONCLUSIONS 

 Baseline EKG revealed a normal sinus rhythm without significant  

 ST and T-wave changes.  Patient walked for 9 minutes and  

 achieved a maximum heart rate of 187 bpm which is available 100%  

 of predicted maximal.  She did not have any angina.  There was  

 no arrhythmia.  There were no ST segment changes to indicate  

 ischemia.  This is a negative stress test with good exercise  

 capacity 

 Baseline echo images revealed normal wall motion wall thickening  

 of all segments.  At peak exercise there was good augmentation  

 of left ventricular wall motion wall thickening of all segments  

 suggesting that there is no evidence of stress-induced ischemia  

 on this study 

 

 Final impression: Good exercise capacity with a negative stress  

 test by EKG criteria and normal stress echocardiogram without  

 evidence of ischemia 

 

 Dr. Jenise Bergeron MD 

 (Electronically Signed) 

 Final Date:      2024 10:50

## 2024-12-09 NOTE — P.PN
Subjective


Progress Note Date: 12/09/24





HISTORY OF PRESENT ILLNESS:  





This is a 49-year-old female with a past medical history significant for an

xiety, depression, and GERD. Patient does not follow with a cardiologist. We 

have been asked to see the patient in consultation for chest pain. Patient 

examined at the bedside by Dr. Salgado.  Patient reports she has been having chest

pain on and off for the past week.  She states the pain sometimes goes in 

between her shoulder blades as well.  She states the pain is not necessarily 

exertionally related.  Denies any nausea or vomiting.  She also reports having 

occasional palpitations.  Patient denies any known history of CAD.  She is a 

non-smoker.





DIAGNOSTICS:


- EKG reveals sinus mechanism with no signs of acute ischemia


- Chest xray negative for acute process


- Laboratory data: WBC 5.4.  Hemoglobin 14.2.  Platelet count 238.  D-dimer 

0.38.  Sodium 137.  Potassium 4.1.  BUN 9.  Creatinine 0.74.  Troponin negative 

x 3.  TSH 0.474.


- Current home cardiac medications include none


- Most recent echocardiogram obtained this admission reveals ejection fraction 

55 to 60%, trace MR, mild TR


- Cardiac catheterization history: Patient denies





12/9/2024


Patient seen and examined.  Patient is scheduled for stress echocardiogram 

today.  She has been started on aspirin and atorvastatin by primary medicine.  

Echocardiogram as above.  Blood pressure  136/76, heart rate 70, pulse ox 96% on

room air.  Patient denies having any chest pain, shortness of breath, 

lightheadedness or dizziness.





PHYSICAL EXAM: 


VITAL SIGNS: Reviewed.


GENERAL: Well-developed in no acute distress. 


HEENT: Head is normocephalic. Pupils are equal, round. Sclerae anicteric. Mucous

membranes of the mouth are moist. Neck supple. No JVD or thyromegaly


LUNGS: Respirations even and unlabored. Lungs essentially clear to auscultation 

bilaterally.


HEART: Regular rate and rhythm.  S1 and S2 heard.


ABDOMEN: Soft. Nondistended. Nontender.


EXTREMITIES: Normal range of motion.  No clubbing or cyanosis.  Peripheral 

pulses intact.  No lower extremity edema


NEUROLOGIC: Awake and alert. Oriented x 3. 





ASSESSMENT: 


Chest pain, troponin negative x 3


Occasional palpitations


History of cholecystectomy


Anxiety


Depression


GERD





PLAN: 


An acute coronary event has been ruled out


2D echo obtained and reviewed


Patient started on aspirin and atorvastatin per primary medicine


Patient to undergo stress echocardiogram this morning


If negative, she may be discharged home from a cardiac standpoint


Further recommendations pending patient course








Nurse practitioner note has been reviewed by physician. Signing provider agrees 

with the documented findings, assessment, and plan of care documented by NP as a

scribe.








Objective





- Vital Signs


Vital signs: 


                                   Vital Signs











Temp  98.5 F   12/09/24 02:00


 


Pulse  73   12/09/24 02:00


 


Resp  17   12/09/24 02:00


 


BP  136/76   12/09/24 02:00


 


Pulse Ox  96   12/09/24 02:00


 


FiO2      








                                 Intake & Output











 12/08/24 12/09/24 12/09/24





 18:59 06:59 18:59


 


Intake Total 768  


 


Balance 768  


 


Intake:   


 


  Oral 768  


 


Other:   


 


  # Voids 4 2 


 


  # Bowel Movements 1  














- Labs


CBC & Chem 7: 


                                 12/09/24 04:04





                                 12/08/24 07:05


Labs: 


                  Abnormal Lab Results - Last 24 Hours (Table)











  12/08/24 Range/Units





  07:05 


 


HDL Cholesterol  62.60 H  (40.00-60.00)  mg/dL

## 2025-05-02 ENCOUNTER — HOSPITAL ENCOUNTER (OUTPATIENT)
Dept: HOSPITAL 47 - RADCTMAIN | Age: 50
Discharge: HOME | End: 2025-05-02
Attending: FAMILY MEDICINE
Payer: COMMERCIAL

## 2025-05-02 DIAGNOSIS — R10.11: Primary | ICD-10-CM

## 2025-05-02 DIAGNOSIS — K44.9: ICD-10-CM

## 2025-05-02 PROCEDURE — 74176 CT ABD & PELVIS W/O CONTRAST: CPT

## 2025-07-28 ENCOUNTER — HOSPITAL ENCOUNTER (OUTPATIENT)
Dept: HOSPITAL 47 - RADMAMWWP | Age: 50
Discharge: HOME | End: 2025-07-28
Attending: FAMILY MEDICINE
Payer: COMMERCIAL

## 2025-07-28 DIAGNOSIS — Z80.3: ICD-10-CM

## 2025-07-28 DIAGNOSIS — R92.333: ICD-10-CM

## 2025-07-28 DIAGNOSIS — Z12.31: Primary | ICD-10-CM

## 2025-07-28 PROCEDURE — 77063 BREAST TOMOSYNTHESIS BI: CPT

## 2025-07-28 PROCEDURE — 77067 SCR MAMMO BI INCL CAD: CPT
